# Patient Record
Sex: FEMALE | Race: WHITE | Employment: OTHER | URBAN - METROPOLITAN AREA
[De-identification: names, ages, dates, MRNs, and addresses within clinical notes are randomized per-mention and may not be internally consistent; named-entity substitution may affect disease eponyms.]

---

## 2017-01-05 ENCOUNTER — HOSPITAL ENCOUNTER (OUTPATIENT)
Dept: MAMMOGRAPHY | Age: 57
Discharge: HOME | End: 2017-01-05
Attending: INTERNAL MEDICINE
Payer: MEDICARE

## 2017-01-05 DIAGNOSIS — Z12.31 VISIT FOR SCREENING MAMMOGRAM: ICD-10-CM

## 2017-01-05 PROCEDURE — 77067 SCR MAMMO BI INCL CAD: CPT

## 2017-12-21 ENCOUNTER — PATIENT OUTREACH (OUTPATIENT)
Dept: CASE MANAGEMENT | Age: 57
End: 2017-12-21

## 2018-04-18 ENCOUNTER — HOSPITAL ENCOUNTER (OUTPATIENT)
Dept: LAB | Age: 58
Discharge: HOME | End: 2018-04-18

## 2018-04-18 PROCEDURE — 88305 TISSUE EXAM BY PATHOLOGIST: CPT | Performed by: INTERNAL MEDICINE

## 2018-05-10 ENCOUNTER — HOSPITAL ENCOUNTER (OUTPATIENT)
Dept: MAMMOGRAPHY | Age: 58
Discharge: HOME | End: 2018-05-10
Attending: INTERNAL MEDICINE
Payer: MEDICARE

## 2018-05-10 DIAGNOSIS — Z12.39 BREAST CANCER SCREENING, HIGH RISK PATIENT: ICD-10-CM

## 2018-05-10 PROCEDURE — 77063 BREAST TOMOSYNTHESIS BI: CPT

## 2019-05-28 ENCOUNTER — HOSPITAL ENCOUNTER (OUTPATIENT)
Dept: MAMMOGRAPHY | Age: 59
Discharge: HOME | End: 2019-05-28
Attending: INTERNAL MEDICINE
Payer: MEDICARE

## 2019-05-28 DIAGNOSIS — Z12.39 BREAST CANCER SCREENING, HIGH RISK PATIENT: ICD-10-CM

## 2019-05-28 PROCEDURE — 77063 BREAST TOMOSYNTHESIS BI: CPT

## 2020-03-02 ENCOUNTER — HOSPITAL ENCOUNTER (EMERGENCY)
Age: 60
Discharge: HOME | End: 2020-03-02
Attending: EMERGENCY MEDICINE
Payer: MEDICARE

## 2020-03-02 VITALS
BODY MASS INDEX: 19.55 KG/M2 | HEART RATE: 90 BPM | WEIGHT: 132 LBS | RESPIRATION RATE: 18 BRPM | SYSTOLIC BLOOD PRESSURE: 137 MMHG | HEIGHT: 69 IN | TEMPERATURE: 98.3 F | DIASTOLIC BLOOD PRESSURE: 90 MMHG | OXYGEN SATURATION: 100 %

## 2020-03-02 DIAGNOSIS — S61.211A LACERATION OF LEFT INDEX FINGER WITHOUT FOREIGN BODY WITHOUT DAMAGE TO NAIL, INITIAL ENCOUNTER: Primary | ICD-10-CM

## 2020-03-02 PROCEDURE — 99282 EMERGENCY DEPT VISIT SF MDM: CPT

## 2020-03-02 PROCEDURE — 90715 TDAP VACCINE 7 YRS/> IM: CPT | Performed by: EMERGENCY MEDICINE

## 2020-03-02 PROCEDURE — 74011250636 HC RX REV CODE- 250/636: Performed by: EMERGENCY MEDICINE

## 2020-03-02 PROCEDURE — 75810000293 HC SIMP/SUPERF WND  RPR

## 2020-03-02 PROCEDURE — 90471 IMMUNIZATION ADMIN: CPT

## 2020-03-02 RX ADMIN — TETANUS TOXOID, REDUCED DIPHTHERIA TOXOID AND ACELLULAR PERTUSSIS VACCINE, ADSORBED 0.5 ML: 5; 2.5; 8; 8; 2.5 SUSPENSION INTRAMUSCULAR at 20:07

## 2020-03-02 NOTE — ED TRIAGE NOTES
Patient reports left middle finger cut on glass. Wrapped and bleeding controlled in triage. Last tetanus shot > 10 years ago.

## 2020-03-03 NOTE — DISCHARGE INSTRUCTIONS
Patient Education        Cuts Closed With Adhesives: Care Instructions  Your Care Instructions  A cut can happen anywhere on your body. The doctor used an adhesive to close the cut. When the adhesive dries, it forms a film that holds the edges of the cut together. Skin adhesives are sometimes called liquid stitches. If the cut went deep and through the skin, the doctor may have put in a layer of stitches below the adhesive. The deeper layer of stitches brings the deep part of the cut together. These stitches will dissolve and don't need to be removed. You don't see the stitches, only the adhesive. You may have a bandage. The doctor has checked you carefully, but problems can develop later. If you notice any problems or new symptoms, get medical treatment right away. Follow-up care is a key part of your treatment and safety. Be sure to make and go to all appointments, and call your doctor if you are having problems. It's also a good idea to know your test results and keep a list of the medicines you take. How can you care for yourself at home? · Keep the cut dry for the first 24 to 48 hours. After this, you can shower if your doctor okays it. Pat the cut dry. · Don't soak the cut, such as in a bathtub. Your doctor will tell you when it's safe to get the cut wet. · If your doctor told you how to care for your cut, follow your doctor's instructions. If you did not get instructions, follow this general advice:  ? Do not put any kind of ointment, cream, or lotion over the area. This can make the adhesive fall off too soon. ? After the first 24 to 48 hours, wash around the cut with clean water 2 times a day. Do not use hydrogen peroxide or alcohol, which can slow healing. ? If the doctor told you to use a bandage, put on a new bandage after cleaning the cut or if the bandage gets wet or dirty. · Prop up the sore area on a pillow anytime you sit or lie down during the next 3 days.  Try to keep it above the level of your heart. This will help reduce swelling. · Leave the skin adhesive on your skin until it falls off on its own. This may take 5 to 10 days. · Do not scratch, rub, or pick at the adhesive. · Do not put the sticky part of a bandage directly on the adhesive. · Avoid any activity that could cause your cut to reopen. · Be safe with medicines. Read and follow all instructions on the label. ? If the doctor gave you a prescription medicine for pain, take it as prescribed. ? If you are not taking a prescription pain medicine, ask your doctor if you can take an over-the-counter medicine. When should you call for help? Call your doctor now or seek immediate medical care if:    · You have new pain, or your pain gets worse.     · The skin near the cut is cold or pale or changes color.     · You have tingling, weakness, or numbness near the cut.     · The cut starts to bleed.     · You have trouble moving the area near the cut.     · You have symptoms of infection, such as:  ? Increased pain, swelling, warmth, or redness around the cut.  ? Red streaks leading from the cut.  ? Pus draining from the cut.  ? A fever.    Watch closely for changes in your health, and be sure to contact your doctor if:    · The cut reopens.     · You do not get better as expected. Where can you learn more? Go to http://wilmer-jewels.info/. Enter P174 in the search box to learn more about \"Cuts Closed With Adhesives: Care Instructions. \"  Current as of: June 26, 2019  Content Version: 12.2  © 9664-0790 [employer]. Care instructions adapted under license by [employer] (which disclaims liability or warranty for this information). If you have questions about a medical condition or this instruction, always ask your healthcare professional. [address] any warranty or liability for your use of this information.
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
## 2020-06-29 PROBLEM — G24.9 DYSTONIA: Status: ACTIVE | Noted: 2020-02-01

## 2020-08-08 ENCOUNTER — HOSPITAL ENCOUNTER (OUTPATIENT)
Dept: MAMMOGRAPHY | Age: 60
Discharge: HOME | End: 2020-08-08
Attending: INTERNAL MEDICINE
Payer: MEDICARE

## 2020-08-08 DIAGNOSIS — Z12.31 VISIT FOR SCREENING MAMMOGRAM: ICD-10-CM

## 2020-08-08 PROCEDURE — 77063 BREAST TOMOSYNTHESIS BI: CPT

## 2020-08-11 NOTE — PROGRESS NOTES
I see that your mammogram showed a mild asymmetry and the radiologist recommended additional mammogram pictures and possibly an ultrasound. This is standard  protocol with any subtle differences they detect. They will contact you to schedule this. Most of us worry a bit when we get this call, but we usually have good news that all is well, so it's truly NOT time to panic.

## 2020-08-12 ENCOUNTER — HOSPITAL ENCOUNTER (OUTPATIENT)
Dept: MAMMOGRAPHY | Age: 60
Discharge: HOME | End: 2020-08-12
Attending: INTERNAL MEDICINE
Payer: MEDICARE

## 2020-08-12 DIAGNOSIS — R92.8 ABNORMAL SCREENING MAMMOGRAM: ICD-10-CM

## 2020-08-12 PROCEDURE — 76642 ULTRASOUND BREAST LIMITED: CPT

## 2020-08-12 PROCEDURE — 77065 DX MAMMO INCL CAD UNI: CPT

## 2020-08-13 NOTE — PROGRESS NOTES
Good news! Let's be sure to repeat the left mammogram in FEb 2020. Norma Longo that on your calendar!

## 2020-08-13 NOTE — PROGRESS NOTES
AND I went ahead and put in the order NOW, so that the radiology schedulers will call you. Go ahead and pick out an appt in Feb so you won't forget.

## 2021-02-04 ENCOUNTER — HOSPITAL ENCOUNTER (OUTPATIENT)
Dept: MAMMOGRAPHY | Age: 61
Discharge: HOME | End: 2021-02-04
Attending: INTERNAL MEDICINE
Payer: MEDICARE

## 2021-02-04 DIAGNOSIS — R92.8 ABNORMAL MAMMOGRAM: ICD-10-CM

## 2021-02-04 PROCEDURE — 77065 DX MAMMO INCL CAD UNI: CPT

## 2021-05-14 ENCOUNTER — HOSPITAL ENCOUNTER (OUTPATIENT)
Dept: PHYSICAL THERAPY | Age: 61
Discharge: HOME | End: 2021-05-14
Attending: PSYCHIATRY & NEUROLOGY
Payer: MEDICARE

## 2021-05-14 DIAGNOSIS — F80.81 STAMMERING AND STUTTERING: ICD-10-CM

## 2021-05-14 PROCEDURE — 92521 EVALUATION OF SPEECH FLUENCY: CPT | Performed by: SPEECH-LANGUAGE PATHOLOGIST

## 2021-05-14 NOTE — THERAPY EVALUATION
Alva Arango : 1960 [place] Secondary:  [facility] Center at [address] [place] [address], [address], [address] [place] [address] [phone]   [phone] OUTPATIENT SPEECH LANGUAGE PATHOLOGY: Initial Assessment ICD-10: Treatment Diagnosis: R47.82 Fluency disorder REFERRING PHYSICIAN: Jigna Campoverde MD MD Orders: evaluate and treat Return Physician Appointment: Unknown PAST MEDICAL HISTORY:  
Ms. Nelle Cabot is a 64 y.o. female who  has a past medical history of Environmental allergies (10/10/2016), Hip strain (), Moderate episode of recurrent major depressive disorder ([facility] [address] (10/10/2016), Pure hypercholesterolemia (10/10/2016), and Sensory neuropathy, hereditary (10/10/2016). She also  has a past surgical history that includes hx other surgical. 
MEDICAL/REFERRING DIAGNOSIS: Stammering and stuttering [F80.81] DATE OF ONSET: 20 months ago PRIOR LEVEL OF FUNCTION: Independent with ADL's PRECAUTIONS/ALLERGIES: NKDA ASSESSMENT: 
Patient is a 64year old female who was referred for speech evaluation due to stammering and stuttering. She reports that her issues with her speech started about 20 months ago. Patient reports that she was living with her sister but it wasn't a pleasant experience. She went to visit her parent's to let them know that she could no longer live with her sister and her parent's were not happy with that situation. About a week later, she began stuttering. She has now since moved out on her own but her stuttering comes and goes and depends on what she's doing and who's she's talking to she will stutter. She endorses that her speech is worse when she's anxious. Patient had shock therapy about 10 years ago. She had 11 shock therapies. She said she ultimately stopped because they were not working.   
 
 
Based on the objective data described below, the patient presents with clinical  s/sx of prolongations and repetitions of sounds related to environmental stressors, significantly impacted by stress/anxiety. Oral motor exam was WNL's. She was given words to read to see if there was a pattern of specific sounds that she would stutter on in addition to figuring out if the stutter was on the initial, medial or final position of words. . She read a total of 50 words. She correctly read 30/50 words without stuttering like characteristics. At the sentence level, she read 10 sentences and had prolongations/repetitions of initial sound on 9/10 sentences. It appears that she has the most difficulty with /s/ and /g/ at the initial position. On occasion, she would prolng on /d/ and /l/ at the initial position. ST recommends treatment at 1x a week to learn breathing/relaxation and easy onset speech strategies to help decrease the amount of prolongations and repetitions so she's able to communicate effectively. She is in agreement with this plan. We also discussed the importance of minimizing/decreasing environmental triggers as well. Patient will benefit from skilled intervention to address the above impairments. ?????? ? ? This section established at most recent assessment?????????? 
PROBLEM LIST (Impairments causing functional limitations): 1. Fluency GOALS: (Goals have been discussed and agreed upon with patient.) SHORT-TERM FUNCTIONAL GOALS: Time Frame: 60 days 1. Patient will utilize breathing/relaxation techniques to help decrease the amount of prolongations at the word, phrase, sentence and conversational level at 80% accuracy. 2. Patient will utilize easy onset strategies at the words, phrase, sentence and conversational level at 80% accuracy. DISCHARGE GOALS: Time Frame: 2 months 1. Patientt will develop functional and intelligible speech and utilize compensatory strategies 
through the use of adequate labial and lingual function, increased articulatory precision and 
speech prosody REHABILITATION POTENTIAL FOR STATED GOALS: GoodPLAN OF CARE: 
INTERVENTIONS PLANNED: (Benefits and precautions of therapy have been discussed with the patient.) 1. Speech therapy TREATMENT PLAN EFFECTIVE DATES: 5/14/2021 TO 7/13/2021 (60 days). FREQUENCY/DURATION: Continue to follow patient 1 time a week for 60 days to address above goals. Regarding Alva Arango's therapy, I certify that the treatment plan above will be carried out by a therapist or under their direction. Thank you for this referral, 
[facility] Referring Physician Signature: Edwin Roberts MD     Date SUBJECTIVE: 
Alert and pleasant Present Symptoms: stuttering Current Medications:  
Current Outpatient Medications on File Prior to Encounter Medication Sig Dispense Refill  ergocalciferol (CALCIFEROL) 200 mcg/mL (8,000 unit/mL) drops Take 2,000 mg by mouth.  naproxen (NAPROSYN) 500 mg tablet Take 440 mg by mouth.  nicotine (NICORETTE) 2 mg gum 2 mg by Buccal route.  buPROPion XL (WELLBUTRIN XL) 300 mg XL tablet Take 1 Tab by mouth every morning. 90 Tab 1  
 FLUoxetine (PROzac) 20 mg capsule Take 2 Caps by mouth daily. 180 Cap 4  
 lamoTRIgine (LaMICtaL) 100 mg tablet Take 1 Tab by mouth two (2) times a day. 180 Tab 1  
 traZODone (DESYREL) 100 mg tablet Take 1-2 Tabs by mouth nightly. 60 Tab 3  clonazePAM (KlonoPIN) 0.5 mg tablet Take 1 Tab by mouth daily as needed for Anxiety. 30 Tab 1  clonazePAM (KlonoPIN) 0.5 mg tablet Take 1 Tab by mouth two (2) times daily as needed (tremor). Max Daily Amount: 1 mg. 60 Tab 2  
 azelastine (ASTELIN) 137 mcg (0.1 %) nasal spray 1 [place] by Both Nostrils route two (2) times a day. 1 Bottle 5  
 levocetirizine (XYZAL) 5 mg tablet Take 1 Tab by mouth daily. 90 Tab 3  cholecalciferol (VITAMIN D3) 1,000 unit cap Take 1 Cap by mouth daily.  multivitamin (ONE A DAY) tablet Take 1 Tab by mouth daily.     
 
No current facility-administered medications on file prior to encounter. Date Last Reviewed: 5/14/21 Social History/Home Situation:  Work/Activity History: Does not work/ Disability OBJECTIVE: 
Objective Measure: Tool Used: Fluency: 
Score:  Initial: 4 Most Recent: X (Date: -- ) Interpretation of Score: This measure should not be used for individuals who exhibit difficulty with rate and prosody as a result of a neurological impairment, cluttering, foreign dialect, or developmental disability. o Level 1:  Fluency is so disrupted that speech is often not functional for communication. Attempts at speech communication are extremely labored in all situations, which render the speaker virtually unintelligible. Alternative means of speaking are used most of the time. Listeners avoid spoken interaction with the individual. 
o Level 2: Speech is functional most of the time, but labored in many day-to-day situations due to extended disruptions of speech flow which sometimes render the individual difficult to understand. Participation in vocational, avocational, and social activities requiring speech is reduced overall. Listener discomfort is evident throughout conversational interactions. o Level 3: Speech is functional. Dysfluencies are evident in all situations, but are particularly frequent in problem situations. Vocational, avocational, and social participation requiring speech is occasionally reduced overall, and significantly reduced within what the individual perceives as problem situations. Some listener discomfort is evident throughout interactions. o Level 4: Speech is functional for communication, but there is extreme situational variation. The frequency and severity of disruptions of speech flow within problem situations is distracting most but not all of the time. Vocational, avocational, and social participation requiring speech is limited most of the time in problem situations. Listeners are often aware of fluency difficulty. o Level 5: Speech is functional for communication, and fluency can be maintained in some situations. Self-monitoring is inconsistent. The frequency and severity of disruptions of speech flow within problem situations is distracting some of the time. Speech difficulties are noticeable when they occur, and sometimes limit vocational, avocational, and social activities requiring speech in problem situations. Listeners are occasionally aware of fluency difficulties relative to particular situations. o Level 6: Speech is functional for communication, and fluency can be maintained most of the time. Self-monitoring is consistent. Vocational, avocational, and social activities requiring speech is not restricted most of the time. Listeners are infrequently aware of fluency difficulties even in problem situations. o Level 7: Disruptions in speech flow do not call attention to the speaker, and participation in activities requiring speech is not limited. May include self-monitoring as needed. Oral Motor Structure/Speech: SPEECH-LANGUAGE COGNITIVE EVALUATION Tests Given: Informal Assessment Mental Status: Alert and pleasant Verbal Expression: 
 
Pragmatics: Emotional  
 
Assessment only; No treatment(s) provided today 
__________________________________________________________________________________________________ History of Present Injury/Illness (Reason for Referral): Fluency Treatment Assessment:  Evaluation completed. Progression/Medical Necessity:  
· Patient is expected to demonstrate progress in expressive communication to increase independence with activities of daily living. Compliance with Program/Exercises: Will assess as treatment progresses}. Reason for Continuation of Services/Other Comments: 
· Patient continues to require skilled intervention due to medical complications. Recommendations/Intent for next treatment session: \"Treatment next visit will focus on goals\".    
Total Treatment Duration: 
Time In: 9012 Time Out: 0930 [facility]

## 2021-05-17 ENCOUNTER — HOSPITAL ENCOUNTER (OUTPATIENT)
Dept: PHYSICAL THERAPY | Age: 61
Discharge: HOME | End: 2021-05-17
Attending: PSYCHIATRY & NEUROLOGY
Payer: MEDICARE

## 2021-05-17 PROCEDURE — 92526 ORAL FUNCTION THERAPY: CPT | Performed by: SPEECH-LANGUAGE PATHOLOGIST

## 2021-05-17 NOTE — THERAPY EVALUATION
Lizz Mehta  : 1960  [place]  Secondary:  [facility] [place] [id number], [address] [place] [address],[address], [place] [address].  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 Marilu Desir is a 64 y.o. female who  has a past medical history of Environmental allergies (10/10/2016), Hip strain (), Moderate episode of recurrent major depressive disorder ([facility] [address] (10/10/2016), Pure hypercholesterolemia (10/10/2016), and Sensory neuropathy, hereditary (10/10/2016). She also  has a past surgical history that includes hx other surgical.  MEDICAL/REFERRING DIAGNOSIS: speech  DATE OF ONSET: 20 months ago  PRIOR LEVEL OF FUNCTION: Independent with ADL's  PRECAUTIONS/ALLERGIES: NKDA  ASSESSMENT:  Patient is a 64year old female who was referred for speech evaluation due to stammering and stuttering. She reports that her issues with her speech started about 20 months ago. Patient reports that she was living with her sister but it wasn't a pleasant experience. She went to visit her parent's to let them know that she could no longer live with her sister and her parent's were not happy with that situation. About a week later, she began stuttering. She has now since moved out on her own but her stuttering comes and goes and depends on what she's doing and who's she's talking to she will stutter. She endorses that her speech is worse when she's anxious. Patient had shock therapy about 10 years ago. She had 11 shock therapies. She said she ultimately stopped because they were not working.        Based on the objective data described below, the patient presents with clinical  s/sx of prolongations and repetitions of sounds related to environmental stressors, significantly impacted by stress/anxiety. Oral motor exam was WNL's. She was given words to read to see if there was a pattern of specific sounds that she would stutter on in addition to figuring out if the stutter was on the initial, medial or final position of words. . She read a total of 50 words. She correctly read 30/50 words without stuttering like characteristics. At the sentence level, she read 10 sentences and had prolongations/repetitions of initial sound on 9/10 sentences. It appears that she has the most difficulty with /s/ and /g/ at the initial position. On occasion, she would prolng on /d/ and /l/ at the initial position. Patient present for therapy this date. No complaints. Read /s/ words using easy onset strategy for breathing and relaxation and prolonged the initial sound with min to mod cues. See below specific tasks. Patient will benefit from skilled intervention to address the above impairments. ?????? ? ? This section established at most recent assessment??????????  PROBLEM LIST (Impairments causing functional limitations):  1. Fluency   GOALS: (Goals have been discussed and agreed upon with patient.)  SHORT-TERM FUNCTIONAL GOALS: Time Frame: 60 days  1. Patient will utilize breathing/relaxation techniques to help decrease the amount of prolongations at the word, phrase, sentence and conversational level at 80% accuracy. 2. Patient will utilize easy onset strategies at the words, phrase, sentence and conversational level at 80% accuracy. DISCHARGE GOALS: Time Frame: 2 months  1. Patientt will develop functional and intelligible speech and utilize compensatory strategies  through the use of adequate labial and lingual function, increased articulatory precision and  speech prosody  REHABILITATION POTENTIAL FOR STATED GOALS: GoodPLAN OF CARE:  INTERVENTIONS PLANNED: (Benefits and precautions of therapy have been discussed with the patient.)  1.  Speech therapy  TREATMENT PLAN EFFECTIVE DATES: 5/14/2021 TO 7/13/2021 (60 days). FREQUENCY/DURATION: Continue to follow patient 1 time a week for 60 days to address above goals. Regarding Alva Arango's therapy, I certify that the treatment plan above will be carried out by a therapist or under their direction. Thank you for this referral,  Ressie Bosworth, M. Ed CCC-SLP                   Referring Physician Signature: Yasmani Nick MD     Date      SUBJECTIVE:  Alert and pleasant   Present Symptoms: stuttering       Current Medications:   Current Outpatient Medications on File Prior to Encounter   Medication Sig Dispense Refill    ergocalciferol (CALCIFEROL) 200 mcg/mL (8,000 unit/mL) drops Take 2,000 mg by mouth.  naproxen (NAPROSYN) 500 mg tablet Take 440 mg by mouth.  nicotine (NICORETTE) 2 mg gum 2 mg by Buccal route.  buPROPion XL (WELLBUTRIN XL) 300 mg XL tablet Take 1 Tab by mouth every morning. 90 Tab 1    FLUoxetine (PROzac) 20 mg capsule Take 2 Caps by mouth daily. 180 Cap 4    lamoTRIgine (LaMICtaL) 100 mg tablet Take 1 Tab by mouth two (2) times a day. 180 Tab 1    traZODone (DESYREL) 100 mg tablet Take 1-2 Tabs by mouth nightly. 60 Tab 3    clonazePAM (KlonoPIN) 0.5 mg tablet Take 1 Tab by mouth daily as needed for Anxiety. 30 Tab 1    clonazePAM (KlonoPIN) 0.5 mg tablet Take 1 Tab by mouth two (2) times daily as needed (tremor). Max Daily Amount: 1 mg. 60 Tab 2    azelastine (ASTELIN) 137 mcg (0.1 %) nasal spray 1 [place] by Both Nostrils route two (2) times a day. 1 Bottle 5    levocetirizine (XYZAL) 5 mg tablet Take 1 Tab by mouth daily. 90 Tab 3    cholecalciferol (VITAMIN D3) 1,000 unit cap Take 1 Cap by mouth daily.  multivitamin (ONE A DAY) tablet Take 1 Tab by mouth daily. No current facility-administered medications on file prior to encounter.          Date Last Reviewed: 5/17/21      Social History/Home Situation:        Work/Activity History: Does not work/ Disability OBJECTIVE:  Objective Measure: Tool Used: Fluency:  Score:  Initial: 4 Most Recent: X (Date: -- )   Interpretation of Score: This measure should not be used for individuals who exhibit difficulty with rate and prosody as a result of a neurological impairment, cluttering, foreign dialect, or developmental disability. o Level 1:  Fluency is so disrupted that speech is often not functional for communication. Attempts at speech communication are extremely labored in all situations, which render the speaker virtually unintelligible. Alternative means of speaking are used most of the time. Listeners avoid spoken interaction with the individual.  o Level 2: Speech is functional most of the time, but labored in many day-to-day situations due to extended disruptions of speech flow which sometimes render the individual difficult to understand. Participation in vocational, avocational, and social activities requiring speech is reduced overall. Listener discomfort is evident throughout conversational interactions. o Level 3: Speech is functional. Dysfluencies are evident in all situations, but are particularly frequent in problem situations. Vocational, avocational, and social participation requiring speech is occasionally reduced overall, and significantly reduced within what the individual perceives as problem situations. Some listener discomfort is evident throughout interactions. o Level 4: Speech is functional for communication, but there is extreme situational variation. The frequency and severity of disruptions of speech flow within problem situations is distracting most but not all of the time. Vocational, avocational, and social participation requiring speech is limited most of the time in problem situations. Listeners are often aware of fluency difficulty. o Level 5: Speech is functional for communication, and fluency can be maintained in some situations. Self-monitoring is inconsistent.  The frequency and severity of disruptions of speech flow within problem situations is distracting some of the time. Speech difficulties are noticeable when they occur, and sometimes limit vocational, avocational, and social activities requiring speech in problem situations. Listeners are occasionally aware of fluency difficulties relative to particular situations. o Level 6: Speech is functional for communication, and fluency can be maintained most of the time. Self-monitoring is consistent. Vocational, avocational, and social activities requiring speech is not restricted most of the time. Listeners are infrequently aware of fluency difficulties even in problem situations. o Level 7: Disruptions in speech flow do not call attention to the speaker, and participation in activities requiring speech is not limited. May include self-monitoring as needed. Oral Motor Structure/Speech:     SPEECH-LANGUAGE COGNITIVE EVALUATION    Tests Given: Informal Assessment    Mental Status: Alert and pleasant     Verbal Expression:    Pragmatics: Emotional     Speech/Language Activities: Activities/Procedures listed utilized to improve expressive communication. Required moderate cueing to increase communication with family/caregivers. 1. Read /s/ words:   CV: 4/5 at [address] [place] [address] with 80% accuracy     V-C: Independently at 100% accuracy     CVC:   1st trial: 10/12: Min A with 83% accuracy     2nd trial: 11/12: Mod I 90% accuracy       __________________________________________________________________________________________________  History of Present Injury/Illness (Reason for Referral): Fluency  Treatment Assessment:  Evaluation completed. Progression/Medical Necessity:   · Patient is expected to demonstrate progress in expressive communication to increase independence with activities of daily living. Compliance with Program/Exercises: Will assess as treatment progresses}.    Reason for Continuation of Services/Other Comments:  · Patient continues to require skilled intervention due to medical complications. Recommendations/Intent for next treatment session: \"Treatment next visit will focus on goals\". Total Treatment Duration:  Time In: 1105  Time Out: 4489 [facility], [place]

## 2021-06-03 ENCOUNTER — HOSPITAL ENCOUNTER (OUTPATIENT)
Dept: PHYSICAL THERAPY | Age: 61
Discharge: HOME | End: 2021-06-03
Attending: PSYCHIATRY & NEUROLOGY
Payer: MEDICARE

## 2021-06-03 PROCEDURE — 92507 TX SP LANG VOICE COMM INDIV: CPT | Performed by: SPEECH-LANGUAGE PATHOLOGIST

## 2021-06-03 NOTE — PROGRESS NOTES
Valencia Rodriguez  : 1960  [place]  Secondary:  [facility] [place] [id number] [facility], [address] [place] [address],[address], [place] [address].  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 Michael Schwartz is a 64 y.o. female who  has a past medical history of Environmental allergies (10/10/2016), Hip strain (), Moderate episode of recurrent major depressive disorder ([facility] [address] (10/10/2016), Pure hypercholesterolemia (10/10/2016), and Sensory neuropathy, hereditary (10/10/2016). She also  has a past surgical history that includes hx other surgical.  MEDICAL/REFERRING DIAGNOSIS: speech  DATE OF ONSET: 20 months ago  PRIOR LEVEL OF FUNCTION: Independent with ADL's  PRECAUTIONS/ALLERGIES: NKDA  ASSESSMENT:  Patient is a 64year old female who was referred for speech evaluation due to stammering and stuttering. She reports that her issues with her speech started about 20 months ago. Patient reports that she was living with her sister but it wasn't a pleasant experience. She went to visit her parent's to let them know that she could no longer live with her sister and her parent's were not happy with that situation. About a week later, she began stuttering. She has now since moved out on her own but her stuttering comes and goes and depends on what she's doing and who's she's talking to she will stutter. She endorses that her speech is worse when she's anxious. Patient had shock therapy about 10 years ago. She had 11 shock therapies. She said she ultimately stopped because they were not working.        Based on the objective data described below, the patient presents with clinical  s/sx of prolongations and repetitions of sounds related to environmental stressors, significantly impacted by stress/anxiety. Oral motor exam was WNL's. She was given words to read to see if there was a pattern of specific sounds that she would stutter on in addition to figuring out if the stutter was on the initial, medial or final position of words. . She read a total of 50 words. She correctly read 30/50 words without stuttering like characteristics. At the sentence level, she read 10 sentences and had prolongations/repetitions of initial sound on 9/10 sentences. It appears that she has the most difficulty with /s/ and /g/ at the initial position. On occasion, she would prolng on /d/ and /l/ at the initial position. Patient present for therapy this date. No complaints. Read /s/ words using easy onset strategy for breathing and relaxation and prolonged the initial sound with min to mod cues. See below specific tasks. Patient will benefit from skilled intervention to address the above impairments. ?????? ? ? This section established at most recent assessment??????????  PROBLEM LIST (Impairments causing functional limitations):  1. Fluency   GOALS: (Goals have been discussed and agreed upon with patient.)  SHORT-TERM FUNCTIONAL GOALS: Time Frame: 60 days  1. Patient will utilize breathing/relaxation techniques to help decrease the amount of prolongations at the word, phrase, sentence and conversational level at 80% accuracy. 2. Patient will utilize easy onset strategies at the words, phrase, sentence and conversational level at 80% accuracy. DISCHARGE GOALS: Time Frame: 2 months  1. Patientt will develop functional and intelligible speech and utilize compensatory strategies  through the use of adequate labial and lingual function, increased articulatory precision and  speech prosody  REHABILITATION POTENTIAL FOR STATED GOALS: GoodPLAN OF CARE:  INTERVENTIONS PLANNED: (Benefits and precautions of therapy have been discussed with the patient.)  1.  Speech therapy  TREATMENT PLAN EFFECTIVE DATES: 5/14/2021 TO 7/13/2021 (60 days). FREQUENCY/DURATION: Continue to follow patient 1 time a week for 60 days to address above goals. Regarding Alva Arango's therapy, I certify that the treatment plan above will be carried out by a therapist or under their direction. Thank you for this referral,  CEM Milligan Ed CCC-SLP                   Referring Physician Signature: Edwin Roberts MD     Date      SUBJECTIVE:  Alert and pleasant   Present Symptoms: stuttering       Current Medications:   Current Outpatient Medications on File Prior to Encounter   Medication Sig Dispense Refill    ergocalciferol (CALCIFEROL) 200 mcg/mL (8,000 unit/mL) drops Take 2,000 mg by mouth.  naproxen (NAPROSYN) 500 mg tablet Take 440 mg by mouth.  nicotine (NICORETTE) 2 mg gum 2 mg by Buccal route.  buPROPion XL (WELLBUTRIN XL) 300 mg XL tablet Take 1 Tab by mouth every morning. 90 Tab 1    FLUoxetine (PROzac) 20 mg capsule Take 2 Caps by mouth daily. 180 Cap 4    lamoTRIgine (LaMICtaL) 100 mg tablet Take 1 Tab by mouth two (2) times a day. 180 Tab 1    traZODone (DESYREL) 100 mg tablet Take 1-2 Tabs by mouth nightly. 60 Tab 3    clonazePAM (KlonoPIN) 0.5 mg tablet Take 1 Tab by mouth daily as needed for Anxiety. 30 Tab 1    clonazePAM (KlonoPIN) 0.5 mg tablet Take 1 Tab by mouth two (2) times daily as needed (tremor). Max Daily Amount: 1 mg. 60 Tab 2    azelastine (ASTELIN) 137 mcg (0.1 %) nasal spray 1 [place] by Both Nostrils route two (2) times a day. 1 Bottle 5    levocetirizine (XYZAL) 5 mg tablet Take 1 Tab by mouth daily. 90 Tab 3    cholecalciferol (VITAMIN D3) 1,000 unit cap Take 1 Cap by mouth daily.  multivitamin (ONE A DAY) tablet Take 1 Tab by mouth daily. No current facility-administered medications on file prior to encounter.         Date Last Reviewed: 6/3/2021      Social History/Home Situation:        Work/Activity History: Does not work/ Disability OBJECTIVE:  Objective Measure: Tool Used: Fluency:  Score:  Initial: 4 Most Recent: X (Date: -- )   Interpretation of Score: This measure should not be used for individuals who exhibit difficulty with rate and prosody as a result of a neurological impairment, cluttering, foreign dialect, or developmental disability. o Level 1:  Fluency is so disrupted that speech is often not functional for communication. Attempts at speech communication are extremely labored in all situations, which render the speaker virtually unintelligible. Alternative means of speaking are used most of the time. Listeners avoid spoken interaction with the individual.  o Level 2: Speech is functional most of the time, but labored in many day-to-day situations due to extended disruptions of speech flow which sometimes render the individual difficult to understand. Participation in vocational, avocational, and social activities requiring speech is reduced overall. Listener discomfort is evident throughout conversational interactions. o Level 3: Speech is functional. Dysfluencies are evident in all situations, but are particularly frequent in problem situations. Vocational, avocational, and social participation requiring speech is occasionally reduced overall, and significantly reduced within what the individual perceives as problem situations. Some listener discomfort is evident throughout interactions. o Level 4: Speech is functional for communication, but there is extreme situational variation. The frequency and severity of disruptions of speech flow within problem situations is distracting most but not all of the time. Vocational, avocational, and social participation requiring speech is limited most of the time in problem situations. Listeners are often aware of fluency difficulty. o Level 5: Speech is functional for communication, and fluency can be maintained in some situations. Self-monitoring is inconsistent.  The frequency and severity of disruptions of speech flow within problem situations is distracting some of the time. Speech difficulties are noticeable when they occur, and sometimes limit vocational, avocational, and social activities requiring speech in problem situations. Listeners are occasionally aware of fluency difficulties relative to particular situations. o Level 6: Speech is functional for communication, and fluency can be maintained most of the time. Self-monitoring is consistent. Vocational, avocational, and social activities requiring speech is not restricted most of the time. Listeners are infrequently aware of fluency difficulties even in problem situations. o Level 7: Disruptions in speech flow do not call attention to the speaker, and participation in activities requiring speech is not limited. May include self-monitoring as needed. Oral Motor Structure/Speech:     SPEECH-LANGUAGE COGNITIVE EVALUATION    Tests Given: Informal Assessment    Mental Status: Alert and pleasant     Verbal Expression:    Pragmatics: Emotional     Speech/Language Activities: Activities/Procedures listed utilized to improve expressive communication. Required moderate cueing to increase communication with family/caregivers. 1. Read /s/ words:   CV: 4/5 at [address] [place] [address] with 80% accuracy     V-C: Independently at 100% accuracy     CVC:   1st trial: 10/12: Min A with 83% accuracy     2nd trial: 11/12: Mod I 90% accuracy         __________________________________________________________________________________________________  History of Present Injury/Illness (Reason for Referral): Fluency  Treatment Assessment:  Evaluation completed. Progression/Medical Necessity:   · Patient is expected to demonstrate progress in expressive communication to increase independence with activities of daily living. Compliance with Program/Exercises: Will assess as treatment progresses}.    Reason for Continuation of Services/Other Comments:  · Patient continues to require skilled intervention due to medical complications. Recommendations/Intent for next treatment session: \"Treatment next visit will focus on goals\". Total Treatment Duration:  Time In: 1300  Time Out: [address] [place], [place]. Nandini Monteiro

## 2021-06-10 ENCOUNTER — HOSPITAL ENCOUNTER (OUTPATIENT)
Dept: PHYSICAL THERAPY | Age: 61
Discharge: HOME | End: 2021-06-10
Attending: PSYCHIATRY & NEUROLOGY
Payer: MEDICARE

## 2021-06-10 PROCEDURE — 92507 TX SP LANG VOICE COMM INDIV: CPT | Performed by: SPEECH-LANGUAGE PATHOLOGIST

## 2021-06-10 NOTE — PROGRESS NOTES
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 Morris Steel is a 64 y.o. female who  has a past medical history of Environmental allergies (10/10/2016), Hip strain (), Moderate episode of recurrent major depressive disorder ([facility] [address] (10/10/2016), Pure hypercholesterolemia (10/10/2016), and Sensory neuropathy, hereditary (10/10/2016). She also  has a past surgical history that includes hx other surgical.  MEDICAL/REFERRING DIAGNOSIS: speech  DATE OF ONSET: 20 months ago  PRIOR LEVEL OF FUNCTION: Independent with ADL's  PRECAUTIONS/ALLERGIES: NKDA  ASSESSMENT:  Patient is a 64year old female who was referred for speech evaluation due to stammering and stuttering. She reports that her issues with her speech started about 20 months ago. Patient reports that she was living with her sister but it wasn't a pleasant experience. She went to visit her parent's to let them know that she could no longer live with her sister and her parent's were not happy with that situation. About a week later, she began stuttering. She has now since moved out on her own but her stuttering comes and goes and depends on what she's doing and who's she's talking to she will stutter. She endorses that her speech is worse when she's anxious. Patient had shock therapy about 10 years ago. She had 11 shock therapies. She said she ultimately stopped because they were not working.        Based on the objective data described below, the patient presents with clinical  s/sx of prolongations and repetitions of sounds related to environmental stressors, significantly impacted by stress/anxiety. Oral motor exam was WNL's. She was given words to read to see if there was a pattern of specific sounds that she would stutter on in addition to figuring out if the stutter was on the initial, medial or final position of words. . She read a total of 50 words. She correctly read 30/50 words without stuttering like characteristics. At the sentence level, she read 10 sentences and had prolongations/repetitions of initial sound on 9/10 sentences. It appears that she has the most difficulty with /s/ and /g/ at the initial position. On occasion, she would prolng on /d/ and /l/ at the initial position. Patient present for therapy this date. No complaints. Much improved speech this date with minimal hesitancy. She was able to use her slowed speech and increased breathing during conversation. Hesitancy observed x's 1. Patient will benefit from skilled intervention to address the above impairments. ?????? ? ? This section established at most recent assessment??????????  PROBLEM LIST (Impairments causing functional limitations):  1. Fluency   GOALS: (Goals have been discussed and agreed upon with patient.)  SHORT-TERM FUNCTIONAL GOALS: Time Frame: 60 days  1. Patient will utilize breathing/relaxation techniques to help decrease the amount of prolongations at the word, phrase, sentence and conversational level at 80% accuracy. 2. Patient will utilize easy onset strategies at the words, phrase, sentence and conversational level at 80% accuracy. DISCHARGE GOALS: Time Frame: 2 months  1. Patientt will develop functional and intelligible speech and utilize compensatory strategies  through the use of adequate labial and lingual function, increased articulatory precision and  speech prosody  REHABILITATION POTENTIAL FOR STATED GOALS: GoodPLAN OF CARE:  INTERVENTIONS PLANNED: (Benefits and precautions of therapy have been discussed with the patient.)  1.  Speech therapy  TREATMENT PLAN EFFECTIVE DATES: 5/14/2021 TO 7/13/2021 (60 days). FREQUENCY/DURATION: Continue to follow patient 1 time a week for 60 days to address above goals. Regarding Alva Arango's therapy, I certify that the treatment plan above will be carried out by a therapist or under their direction. Thank you for this referral,  [facility] CCC-SLP                   Referring Physician Signature: Raji Carr MD     Date      SUBJECTIVE:  Alert and pleasant   Present Symptoms: stuttering       Current Medications:   Current Outpatient Medications on File Prior to Encounter   Medication Sig Dispense Refill    ergocalciferol (CALCIFEROL) 200 mcg/mL (8,000 unit/mL) drops Take 2,000 mg by mouth.  naproxen (NAPROSYN) 500 mg tablet Take 440 mg by mouth.  nicotine (NICORETTE) 2 mg gum 2 mg by Buccal route.  buPROPion XL (WELLBUTRIN XL) 300 mg XL tablet Take 1 Tab by mouth every morning. 90 Tab 1    FLUoxetine (PROzac) 20 mg capsule Take 2 Caps by mouth daily. 180 Cap 4    lamoTRIgine (LaMICtaL) 100 mg tablet Take 1 Tab by mouth two (2) times a day. 180 Tab 1    traZODone (DESYREL) 100 mg tablet Take 1-2 Tabs by mouth nightly. 60 Tab 3    clonazePAM (KlonoPIN) 0.5 mg tablet Take 1 Tab by mouth daily as needed for Anxiety. 30 Tab 1    clonazePAM (KlonoPIN) 0.5 mg tablet Take 1 Tab by mouth two (2) times daily as needed (tremor). Max Daily Amount: 1 mg. 60 Tab 2    azelastine (ASTELIN) 137 mcg (0.1 %) nasal spray 1 [place] by Both Nostrils route two (2) times a day. 1 Bottle 5    levocetirizine (XYZAL) 5 mg tablet Take 1 Tab by mouth daily. 90 Tab 3    cholecalciferol (VITAMIN D3) 1,000 unit cap Take 1 Cap by mouth daily.  multivitamin (ONE A DAY) tablet Take 1 Tab by mouth daily. No current facility-administered medications on file prior to encounter.         Date Last Reviewed: 6/10/2021      Social History/Home Situation:        Work/Activity History: Does not work/ Disability     OBJECTIVE:  Objective Measure: Tool Used: Fluency:  Score:  Initial: 4 Most Recent: X (Date: -- )   Interpretation of Score: This measure should not be used for individuals who exhibit difficulty with rate and prosody as a result of a neurological impairment, cluttering, foreign dialect, or developmental disability. o Level 1:  Fluency is so disrupted that speech is often not functional for communication. Attempts at speech communication are extremely labored in all situations, which render the speaker virtually unintelligible. Alternative means of speaking are used most of the time. Listeners avoid spoken interaction with the individual.  o Level 2: Speech is functional most of the time, but labored in many day-to-day situations due to extended disruptions of speech flow which sometimes render the individual difficult to understand. Participation in vocational, avocational, and social activities requiring speech is reduced overall. Listener discomfort is evident throughout conversational interactions. o Level 3: Speech is functional. Dysfluencies are evident in all situations, but are particularly frequent in problem situations. Vocational, avocational, and social participation requiring speech is occasionally reduced overall, and significantly reduced within what the individual perceives as problem situations. Some listener discomfort is evident throughout interactions. o Level 4: Speech is functional for communication, but there is extreme situational variation. The frequency and severity of disruptions of speech flow within problem situations is distracting most but not all of the time. Vocational, avocational, and social participation requiring speech is limited most of the time in problem situations. Listeners are often aware of fluency difficulty. o Level 5: Speech is functional for communication, and fluency can be maintained in some situations. Self-monitoring is inconsistent.  The frequency and severity of disruptions of speech flow within problem situations is distracting some of the time. Speech difficulties are noticeable when they occur, and sometimes limit vocational, avocational, and social activities requiring speech in problem situations. Listeners are occasionally aware of fluency difficulties relative to particular situations. o Level 6: Speech is functional for communication, and fluency can be maintained most of the time. Self-monitoring is consistent. Vocational, avocational, and social activities requiring speech is not restricted most of the time. Listeners are infrequently aware of fluency difficulties even in problem situations. o Level 7: Disruptions in speech flow do not call attention to the speaker, and participation in activities requiring speech is not limited. May include self-monitoring as needed. Oral Motor Structure/Speech:     SPEECH-LANGUAGE COGNITIVE EVALUATION    Tests Given: Informal Assessment    Mental Status: Alert and pleasant     Verbal Expression:    Pragmatics: Emotional     Speech/Language Activities: Activities/Procedures listed utilized to improve expressive communication. Required moderate cueing to increase communication with family/caregivers. Participated in conversational starters using her speech strategies with hesitancy x's 1    __________________________________________________________________________________________________  History of Present Injury/Illness (Reason for Referral): Fluency  Treatment Assessment:  Evaluation completed. Progression/Medical Necessity:   · Patient is expected to demonstrate progress in expressive communication to increase independence with activities of daily living. Compliance with Program/Exercises: Will assess as treatment progresses}. Reason for Continuation of Services/Other Comments:  · Patient continues to require skilled intervention due to medical complications.   Recommendations/Intent for next treatment session: \"Treatment next visit will focus on goals\". Total Treatment Duration:  Time In: 1400  Time Out: 8[address] [place], [place] [address]

## 2021-06-17 ENCOUNTER — HOSPITAL ENCOUNTER (OUTPATIENT)
Dept: PHYSICAL THERAPY | Age: 61
Discharge: HOME | End: 2021-06-17
Attending: PSYCHIATRY & NEUROLOGY
Payer: MEDICARE

## 2021-06-17 PROCEDURE — 92507 TX SP LANG VOICE COMM INDIV: CPT | Performed by: SPEECH-LANGUAGE PATHOLOGIST

## 2021-06-17 NOTE — PROGRESS NOTES
Jeny Marshall  : 1960  [place]  Secondary:  Therapy Center at [address], [address] [place] [address],[address], [place] [address].  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 Nathalie Tenorio is a 64 y.o. female who  has a past medical history of Environmental allergies (10/10/2016), Hip strain (), Moderate episode of recurrent major depressive disorder ([facility] [address] (10/10/2016), Pure hypercholesterolemia (10/10/2016), and Sensory neuropathy, hereditary (10/10/2016). She also  has a past surgical history that includes hx other surgical.  MEDICAL/REFERRING DIAGNOSIS: speech  DATE OF ONSET: 20 months ago  PRIOR LEVEL OF FUNCTION: Independent with ADL's  PRECAUTIONS/ALLERGIES: NKDA  ASSESSMENT:  Patient is a 64year old female who was referred for speech evaluation due to stammering and stuttering. She reports that her issues with her speech started about 20 months ago. Patient reports that she was living with her sister but it wasn't a pleasant experience. She went to visit her parent's to let them know that she could no longer live with her sister and her parent's were not happy with that situation. About a week later, she began stuttering. She has now since moved out on her own but her stuttering comes and goes and depends on what she's doing and who's she's talking to she will stutter. She endorses that her speech is worse when she's anxious. Patient had shock therapy about 10 years ago. She had 11 shock therapies. She said she ultimately stopped because they were not working.        Based on the objective data described below, the patient presents with clinical  s/sx of prolongations and repetitions of sounds related to environmental stressors, significantly impacted by stress/anxiety. Oral motor exam was WNL's. She was given words to read to see if there was a pattern of specific sounds that she would stutter on in addition to figuring out if the stutter was on the initial, medial or final position of words. . She read a total of 50 words. She correctly read 30/50 words without stuttering like characteristics. At the sentence level, she read 10 sentences and had prolongations/repetitions of initial sound on 9/10 sentences. It appears that she has the most difficulty with /s/ and /g/ at the initial position. On occasion, she would prolng on /d/ and /l/ at the initial position. Patient present for therapy this date. No complaints. Much improved speech this date with minimal hesitancy. She was able to use her slowed speech and increased breathing during conversation. Hesitancy observed x's 1. Patient will benefit from skilled intervention to address the above impairments. ?????? ? ? This section established at most recent assessment??????????  PROBLEM LIST (Impairments causing functional limitations):  1. Fluency   GOALS: (Goals have been discussed and agreed upon with patient.)  SHORT-TERM FUNCTIONAL GOALS: Time Frame: 60 days  1. Patient will utilize breathing/relaxation techniques to help decrease the amount of prolongations at the word, phrase, sentence and conversational level at 80% accuracy. 2. Patient will utilize easy onset strategies at the words, phrase, sentence and conversational level at 80% accuracy. DISCHARGE GOALS: Time Frame: 2 months  1. Patientt will develop functional and intelligible speech and utilize compensatory strategies  through the use of adequate labial and lingual function, increased articulatory precision and  speech prosody  REHABILITATION POTENTIAL FOR STATED GOALS: GoodPLAN OF CARE:  INTERVENTIONS PLANNED: (Benefits and precautions of therapy have been discussed with the patient.)  1.  Speech therapy  TREATMENT PLAN EFFECTIVE DATES: 5/14/2021 TO 7/13/2021 (60 days). FREQUENCY/DURATION: Continue to follow patient 1 time a week for 60 days to address above goals. Regarding Alva Arango's therapy, I certify that the treatment plan above will be carried out by a therapist or under their direction. Thank you for this referral,  J CARLOS Huber. Ed CCC-SLP                   Referring Physician Signature: Mary Lou Garcia MD     Date      SUBJECTIVE:  Alert and pleasant   Present Symptoms: stuttering       Current Medications:   Current Outpatient Medications on File Prior to Encounter   Medication Sig Dispense Refill    ergocalciferol (CALCIFEROL) 200 mcg/mL (8,000 unit/mL) drops Take 2,000 mg by mouth.  naproxen (NAPROSYN) 500 mg tablet Take 440 mg by mouth.  nicotine (NICORETTE) 2 mg gum 2 mg by Buccal route.  buPROPion XL (WELLBUTRIN XL) 300 mg XL tablet Take 1 Tab by mouth every morning. 90 Tab 1    FLUoxetine (PROzac) 20 mg capsule Take 2 Caps by mouth daily. 180 Cap 4    lamoTRIgine (LaMICtaL) 100 mg tablet Take 1 Tab by mouth two (2) times a day. 180 Tab 1    traZODone (DESYREL) 100 mg tablet Take 1-2 Tabs by mouth nightly. 60 Tab 3    clonazePAM (KlonoPIN) 0.5 mg tablet Take 1 Tab by mouth daily as needed for Anxiety. 30 Tab 1    clonazePAM (KlonoPIN) 0.5 mg tablet Take 1 Tab by mouth two (2) times daily as needed (tremor). Max Daily Amount: 1 mg. 60 Tab 2    azelastine (ASTELIN) 137 mcg (0.1 %) nasal spray 1 [place] by Both Nostrils route two (2) times a day. 1 Bottle 5    levocetirizine (XYZAL) 5 mg tablet Take 1 Tab by mouth daily. 90 Tab 3    cholecalciferol (VITAMIN D3) 1,000 unit cap Take 1 Cap by mouth daily.  multivitamin (ONE A DAY) tablet Take 1 Tab by mouth daily. No current facility-administered medications on file prior to encounter.         Date Last Reviewed: 6/17/2021      Social History/Home Situation:        Work/Activity History: Does not work/ Disability     OBJECTIVE:  Objective Measure: Tool Used: Fluency:  Score:  Initial: 4 Most Recent: X (Date: -- )   Interpretation of Score: This measure should not be used for individuals who exhibit difficulty with rate and prosody as a result of a neurological impairment, cluttering, foreign dialect, or developmental disability. o Level 1:  Fluency is so disrupted that speech is often not functional for communication. Attempts at speech communication are extremely labored in all situations, which render the speaker virtually unintelligible. Alternative means of speaking are used most of the time. Listeners avoid spoken interaction with the individual.  o Level 2: Speech is functional most of the time, but labored in many day-to-day situations due to extended disruptions of speech flow which sometimes render the individual difficult to understand. Participation in vocational, avocational, and social activities requiring speech is reduced overall. Listener discomfort is evident throughout conversational interactions. o Level 3: Speech is functional. Dysfluencies are evident in all situations, but are particularly frequent in problem situations. Vocational, avocational, and social participation requiring speech is occasionally reduced overall, and significantly reduced within what the individual perceives as problem situations. Some listener discomfort is evident throughout interactions. o Level 4: Speech is functional for communication, but there is extreme situational variation. The frequency and severity of disruptions of speech flow within problem situations is distracting most but not all of the time. Vocational, avocational, and social participation requiring speech is limited most of the time in problem situations. Listeners are often aware of fluency difficulty. o Level 5: Speech is functional for communication, and fluency can be maintained in some situations. Self-monitoring is inconsistent.  The frequency and severity of disruptions of speech flow within problem situations is distracting some of the time. Speech difficulties are noticeable when they occur, and sometimes limit vocational, avocational, and social activities requiring speech in problem situations. Listeners are occasionally aware of fluency difficulties relative to particular situations. o Level 6: Speech is functional for communication, and fluency can be maintained most of the time. Self-monitoring is consistent. Vocational, avocational, and social activities requiring speech is not restricted most of the time. Listeners are infrequently aware of fluency difficulties even in problem situations. o Level 7: Disruptions in speech flow do not call attention to the speaker, and participation in activities requiring speech is not limited. May include self-monitoring as needed. Oral Motor Structure/Speech:     SPEECH-LANGUAGE COGNITIVE EVALUATION    Tests Given: Informal Assessment    Mental Status: Alert and pleasant     Verbal Expression:    Pragmatics: Emotional     Speech/Language Activities: Activities/Procedures listed utilized to improve expressive communication. Required moderate cueing to increase communication with family/caregivers. Participated in conversational starters using her speech strategies with hesitancy x's 1    __________________________________________________________________________________________________  History of Present Injury/Illness (Reason for Referral): Fluency  Treatment Assessment:  Evaluation completed. Progression/Medical Necessity:   · Patient is expected to demonstrate progress in expressive communication to increase independence with activities of daily living. Compliance with Program/Exercises: Will assess as treatment progresses}. Reason for Continuation of Services/Other Comments:  · Patient continues to require skilled intervention due to medical complications.   Recommendations/Intent for next treatment session: \"Treatment next visit will focus on goals\". Total Treatment Duration:  Time In: 1300  Time Out: 6019 [place], [place]. Rafael Centeno

## 2021-06-23 ENCOUNTER — HOSPITAL ENCOUNTER (OUTPATIENT)
Dept: PHYSICAL THERAPY | Age: 61
Discharge: HOME | End: 2021-06-23
Attending: PSYCHIATRY & NEUROLOGY
Payer: MEDICARE

## 2021-06-23 PROCEDURE — 92507 TX SP LANG VOICE COMM INDIV: CPT | Performed by: SPEECH-LANGUAGE PATHOLOGIST

## 2021-06-23 NOTE — PROGRESS NOTES
Aidee Paula  : 1960  [place]  Secondary:  [facility] [place] [address] [facility], [address] [place] [address],[address], [facility] [address]  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 Ryan Angel is a 64 y.o. female who  has a past medical history of Environmental allergies (10/10/2016), Hip strain (), Moderate episode of recurrent major depressive disorder ([facility] [address] (10/10/2016), Pure hypercholesterolemia (10/10/2016), and Sensory neuropathy, hereditary (10/10/2016). She also  has a past surgical history that includes hx other surgical.  MEDICAL/REFERRING DIAGNOSIS: speech  DATE OF ONSET: 20 months ago  PRIOR LEVEL OF FUNCTION: Independent with ADL's  PRECAUTIONS/ALLERGIES: NKDA  ASSESSMENT:  Patient is a 64year old female who was referred for speech evaluation due to stammering and stuttering. She reports that her issues with her speech started about 20 months ago. Patient reports that she was living with her sister but it wasn't a pleasant experience. She went to visit her parent's to let them know that she could no longer live with her sister and her parent's were not happy with that situation. About a week later, she began stuttering. She has now since moved out on her own but her stuttering comes and goes and depends on what she's doing and who's she's talking to she will stutter. She endorses that her speech is worse when she's anxious. Patient had shock therapy about 10 years ago. She had 11 shock therapies. She said she ultimately stopped because they were not working.        Based on the objective data described below, the patient presents with clinical  s/sx of prolongations and repetitions of sounds related to environmental stressors, significantly impacted by stress/anxiety. Oral motor exam was WNL's. She was given words to read to see if there was a pattern of specific sounds that she would stutter on in addition to figuring out if the stutter was on the initial, medial or final position of words. . She read a total of 50 words. She correctly read 30/50 words without stuttering like characteristics. At the sentence level, she read 10 sentences and had prolongations/repetitions of initial sound on 9/10 sentences. It appears that she has the most difficulty with /s/ and /g/ at the initial position. On occasion, she would prolng on /d/ and /l/ at the initial position. Patient present for therapy this date. No complaints. Much improved speech this date with minimal hesitancy. She was able to use her slowed speech and increased breathing during conversation. Hesitancy observed x's 1. Patient will benefit from skilled intervention to address the above impairments. ?????? ? ? This section established at most recent assessment??????????  PROBLEM LIST (Impairments causing functional limitations):  1. Fluency   GOALS: (Goals have been discussed and agreed upon with patient.)  SHORT-TERM FUNCTIONAL GOALS: Time Frame: 60 days  1. Patient will utilize breathing/relaxation techniques to help decrease the amount of prolongations at the word, phrase, sentence and conversational level at 80% accuracy. 2. Patient will utilize easy onset strategies at the words, phrase, sentence and conversational level at 80% accuracy. DISCHARGE GOALS: Time Frame: 2 months  1. Patientt will develop functional and intelligible speech and utilize compensatory strategies  through the use of adequate labial and lingual function, increased articulatory precision and  speech prosody  REHABILITATION POTENTIAL FOR STATED GOALS: GoodPLAN OF CARE:  INTERVENTIONS PLANNED: (Benefits and precautions of therapy have been discussed with the patient.)  1.  Speech therapy  TREATMENT PLAN EFFECTIVE DATES: 5/14/2021 TO 7/13/2021 (60 days). FREQUENCY/DURATION: Continue to follow patient 1 time a week for 60 days to address above goals. Regarding Alva Arango's therapy, I certify that the treatment plan above will be carried out by a therapist or under their direction. Thank you for this referral,  CEM Proctor Ed CCC-SLP                   Referring Physician Signature: Jamal Chase MD     Date      SUBJECTIVE:  Alert and pleasant   Present Symptoms: stuttering       Current Medications:   Current Outpatient Medications on File Prior to Encounter   Medication Sig Dispense Refill    ergocalciferol (CALCIFEROL) 200 mcg/mL (8,000 unit/mL) drops Take 2,000 mg by mouth.  naproxen (NAPROSYN) 500 mg tablet Take 440 mg by mouth.  nicotine (NICORETTE) 2 mg gum 2 mg by Buccal route.  buPROPion XL (WELLBUTRIN XL) 300 mg XL tablet Take 1 Tab by mouth every morning. 90 Tab 1    FLUoxetine (PROzac) 20 mg capsule Take 2 Caps by mouth daily. 180 Cap 4    lamoTRIgine (LaMICtaL) 100 mg tablet Take 1 Tab by mouth two (2) times a day. 180 Tab 1    traZODone (DESYREL) 100 mg tablet Take 1-2 Tabs by mouth nightly. 60 Tab 3    clonazePAM (KlonoPIN) 0.5 mg tablet Take 1 Tab by mouth daily as needed for Anxiety. 30 Tab 1    clonazePAM (KlonoPIN) 0.5 mg tablet Take 1 Tab by mouth two (2) times daily as needed (tremor). Max Daily Amount: 1 mg. 60 Tab 2    azelastine (ASTELIN) 137 mcg (0.1 %) nasal spray 1 [place] by Both Nostrils route two (2) times a day. 1 Bottle 5    levocetirizine (XYZAL) 5 mg tablet Take 1 Tab by mouth daily. 90 Tab 3    cholecalciferol (VITAMIN D3) 1,000 unit cap Take 1 Cap by mouth daily.  multivitamin (ONE A DAY) tablet Take 1 Tab by mouth daily. No current facility-administered medications on file prior to encounter.         Date Last Reviewed: 6/23/2021      Social History/Home Situation:        Work/Activity History: Does not work/ Disability     OBJECTIVE:  Objective Measure: Tool Used: Fluency:  Score:  Initial: 4 Most Recent: X (Date: -- )   Interpretation of Score: This measure should not be used for individuals who exhibit difficulty with rate and prosody as a result of a neurological impairment, cluttering, foreign dialect, or developmental disability. o Level 1:  Fluency is so disrupted that speech is often not functional for communication. Attempts at speech communication are extremely labored in all situations, which render the speaker virtually unintelligible. Alternative means of speaking are used most of the time. Listeners avoid spoken interaction with the individual.  o Level 2: Speech is functional most of the time, but labored in many day-to-day situations due to extended disruptions of speech flow which sometimes render the individual difficult to understand. Participation in vocational, avocational, and social activities requiring speech is reduced overall. Listener discomfort is evident throughout conversational interactions. o Level 3: Speech is functional. Dysfluencies are evident in all situations, but are particularly frequent in problem situations. Vocational, avocational, and social participation requiring speech is occasionally reduced overall, and significantly reduced within what the individual perceives as problem situations. Some listener discomfort is evident throughout interactions. o Level 4: Speech is functional for communication, but there is extreme situational variation. The frequency and severity of disruptions of speech flow within problem situations is distracting most but not all of the time. Vocational, avocational, and social participation requiring speech is limited most of the time in problem situations. Listeners are often aware of fluency difficulty. o Level 5: Speech is functional for communication, and fluency can be maintained in some situations. Self-monitoring is inconsistent.  The frequency and severity of disruptions of speech flow within problem situations is distracting some of the time. Speech difficulties are noticeable when they occur, and sometimes limit vocational, avocational, and social activities requiring speech in problem situations. Listeners are occasionally aware of fluency difficulties relative to particular situations. o Level 6: Speech is functional for communication, and fluency can be maintained most of the time. Self-monitoring is consistent. Vocational, avocational, and social activities requiring speech is not restricted most of the time. Listeners are infrequently aware of fluency difficulties even in problem situations. o Level 7: Disruptions in speech flow do not call attention to the speaker, and participation in activities requiring speech is not limited. May include self-monitoring as needed. Oral Motor Structure/Speech:     SPEECH-LANGUAGE COGNITIVE EVALUATION    Tests Given: Informal Assessment    Mental Status: Alert and pleasant     Verbal Expression:    Pragmatics: Emotional     Speech/Language Activities: Activities/Procedures listed utilized to improve expressive communication. Required moderate cueing to increase communication with family/caregivers. Participated in conversational starters using her speech strategies with hesitancy x's 1    __________________________________________________________________________________________________  History of Present Injury/Illness (Reason for Referral): Fluency  Treatment Assessment:  Evaluation completed. Progression/Medical Necessity:   · Patient is expected to demonstrate progress in expressive communication to increase independence with activities of daily living. Compliance with Program/Exercises: Will assess as treatment progresses}. Reason for Continuation of Services/Other Comments:  · Patient continues to require skilled intervention due to medical complications.   Recommendations/Intent for next treatment session: \"Treatment next visit will focus on goals\". Total Treatment Duration:  Time In: 1300  Time Out: [facility], Lizette Monday. Sravan Claire

## 2021-06-29 ENCOUNTER — APPOINTMENT (OUTPATIENT)
Dept: PHYSICAL THERAPY | Age: 61
End: 2021-06-29
Attending: PSYCHIATRY & NEUROLOGY
Payer: MEDICARE

## 2021-07-22 ENCOUNTER — APPOINTMENT (OUTPATIENT)
Dept: PHYSICAL THERAPY | Age: 61
End: 2021-07-22
Attending: PSYCHIATRY & NEUROLOGY

## 2021-08-20 NOTE — PROGRESS NOTES
Aidee Paula  : 1960  [place]  Secondary:  [facility] at [address], [facility] [address]  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 Ryan Angel is a 64 y.o. female who  has a past medical history of Environmental allergies (10/10/2016), Hip strain (), Moderate episode of recurrent major depressive disorder ([facility] [address] (10/10/2016), Pure hypercholesterolemia (10/10/2016), and Sensory neuropathy, hereditary (10/10/2016). She also  has a past surgical history that includes hx other surgical.  MEDICAL/REFERRING DIAGNOSIS: speech  DATE OF ONSET: 20 months ago  PRIOR LEVEL OF FUNCTION: Independent with ADL's  PRECAUTIONS/ALLERGIES: NKDA  ASSESSMENT:  Patient is a 64year old female who was referred for speech evaluation due to stammering and stuttering. She reports that her issues with her speech started about 20 months ago. Patient reports that she was living with her sister but it wasn't a pleasant experience. She went to visit her parent's to let them know that she could no longer live with her sister and her parent's were not happy with that situation. About a week later, she began stuttering. She has now since moved out on her own but her stuttering comes and goes and depends on what she's doing and who's she's talking to she will stutter. She endorses that her speech is worse when she's anxious. Patient had shock therapy about 10 years ago. She had 11 shock therapies. She said she ultimately stopped because they were not working.        Based on the objective data described below, the patient presents with clinical  s/sx of prolongations and repetitions of sounds related to environmental stressors, significantly impacted by stress/anxiety. Oral motor exam was WNL's. She was given words to read to see if there was a pattern of specific sounds that she would stutter on in addition to figuring out if the stutter was on the initial, medial or final position of words. . She read a total of 50 words. She correctly read 30/50 words without stuttering like characteristics. At the sentence level, she read 10 sentences and had prolongations/repetitions of initial sound on 9/10 sentences. It appears that she has the most difficulty with /s/ and /g/ at the initial position. On occasion, she would prolng on /d/ and /l/ at the initial position. Patient attended 5 treatment addressing her speech hesitations due to increased stress and anxiety. She was doing well initiating breathing and relaxation strategies to help decrease her hesitations, prolongations and repetition of speech sounds. I do feel she's independent with HEP therefore this will serve as her discharge summary. ?????? ? ? This section established at most recent assessment??????????  PROBLEM LIST (Impairments causing functional limitations):  1. Fluency   GOALS: (Goals have been discussed and agreed upon with patient.)  SHORT-TERM FUNCTIONAL GOALS: Time Frame: 60 days  1. Patient will utilize breathing/relaxation techniques to help decrease the amount of prolongations at the word, phrase, sentence and conversational level at 80% accuracy. Met   2. Patient will utilize easy onset strategies at the words, phrase, sentence and conversational level at 80% accuracy. Met   DISCHARGE GOALS: Time Frame: 2 months  1. Patientt will develop functional and intelligible speech and utilize compensatory strategies  through the use of adequate labial and lingual function, increased articulatory precision and  speech prosody.  Met   REHABILITATION POTENTIAL FOR STATED GOALS: GoodSUBJECTIVE:  Alert and pleasant   Present Symptoms: stuttering Current Medications:   Current Outpatient Medications on File Prior to Encounter   Medication Sig Dispense Refill    ergocalciferol (CALCIFEROL) 200 mcg/mL (8,000 unit/mL) drops Take 2,000 mg by mouth.  naproxen (NAPROSYN) 500 mg tablet Take 440 mg by mouth.  nicotine (NICORETTE) 2 mg gum 2 mg by Buccal route.  buPROPion XL (WELLBUTRIN XL) 300 mg XL tablet Take 1 Tab by mouth every morning. 90 Tab 1    FLUoxetine (PROzac) 20 mg capsule Take 2 Caps by mouth daily. 180 Cap 4    lamoTRIgine (LaMICtaL) 100 mg tablet Take 1 Tab by mouth two (2) times a day. 180 Tab 1    traZODone (DESYREL) 100 mg tablet Take 1-2 Tabs by mouth nightly. 60 Tab 3    clonazePAM (KlonoPIN) 0.5 mg tablet Take 1 Tab by mouth daily as needed for Anxiety. 30 Tab 1    clonazePAM (KlonoPIN) 0.5 mg tablet Take 1 Tab by mouth two (2) times daily as needed (tremor). Max Daily Amount: 1 mg. 60 Tab 2    azelastine (ASTELIN) 137 mcg (0.1 %) nasal spray 1 [place] by Both Nostrils route two (2) times a day. 1 Bottle 5    levocetirizine (XYZAL) 5 mg tablet Take 1 Tab by mouth daily. 90 Tab 3    cholecalciferol (VITAMIN D3) 1,000 unit cap Take 1 Cap by mouth daily.  multivitamin (ONE A DAY) tablet Take 1 Tab by mouth daily. No current facility-administered medications on file prior to encounter. Date Last Reviewed: 6/23/2021      Social History/Home Situation:        Work/Activity History: Does not work/ Disability     OBJECTIVE:  Objective Measure: Tool Used: Fluency:  Score:  Initial: 4 Most Recent: X (Date: -- )   Interpretation of Score: This measure should not be used for individuals who exhibit difficulty with rate and prosody as a result of a neurological impairment, cluttering, foreign dialect, or developmental disability. o Level 1:  Fluency is so disrupted that speech is often not functional for communication.   Attempts at speech communication are extremely labored in all situations, which render the speaker virtually unintelligible. Alternative means of speaking are used most of the time. Listeners avoid spoken interaction with the individual.  o Level 2: Speech is functional most of the time, but labored in many day-to-day situations due to extended disruptions of speech flow which sometimes render the individual difficult to understand. Participation in vocational, avocational, and social activities requiring speech is reduced overall. Listener discomfort is evident throughout conversational interactions. o Level 3: Speech is functional. Dysfluencies are evident in all situations, but are particularly frequent in problem situations. Vocational, avocational, and social participation requiring speech is occasionally reduced overall, and significantly reduced within what the individual perceives as problem situations. Some listener discomfort is evident throughout interactions. o Level 4: Speech is functional for communication, but there is extreme situational variation. The frequency and severity of disruptions of speech flow within problem situations is distracting most but not all of the time. Vocational, avocational, and social participation requiring speech is limited most of the time in problem situations. Listeners are often aware of fluency difficulty. o Level 5: Speech is functional for communication, and fluency can be maintained in some situations. Self-monitoring is inconsistent. The frequency and severity of disruptions of speech flow within problem situations is distracting some of the time. Speech difficulties are noticeable when they occur, and sometimes limit vocational, avocational, and social activities requiring speech in problem situations. Listeners are occasionally aware of fluency difficulties relative to particular situations. o Level 6: Speech is functional for communication, and fluency can be maintained most of the time. Self-monitoring is consistent.  Vocational, avocational, and social activities requiring speech is not restricted most of the time. Listeners are infrequently aware of fluency difficulties even in problem situations. o Level 7: Disruptions in speech flow do not call attention to the speaker, and participation in activities requiring speech is not limited. May include self-monitoring as needed. CEM Morillo

## 2021-09-22 ENCOUNTER — TRANSCRIBE ORDER (OUTPATIENT)
Dept: SCHEDULING | Age: 61
End: 2021-09-22

## 2021-09-22 DIAGNOSIS — Z12.31 SCREENING MAMMOGRAM, ENCOUNTER FOR: Primary | ICD-10-CM

## 2021-10-26 ENCOUNTER — HOSPITAL ENCOUNTER (OUTPATIENT)
Dept: MAMMOGRAPHY | Age: 61
Discharge: HOME | End: 2021-10-26
Attending: INTERNAL MEDICINE
Payer: MEDICARE

## 2021-10-26 DIAGNOSIS — Z12.31 SCREENING MAMMOGRAM, ENCOUNTER FOR: ICD-10-CM

## 2021-10-26 PROCEDURE — 77063 BREAST TOMOSYNTHESIS BI: CPT

## 2021-10-28 ENCOUNTER — APPOINTMENT (OUTPATIENT)
Dept: ULTRASOUND IMAGING | Age: 61
End: 2021-10-28
Attending: EMERGENCY MEDICINE
Payer: MEDICARE

## 2021-10-28 ENCOUNTER — HOSPITAL ENCOUNTER (OUTPATIENT)
Dept: MRI IMAGING | Age: 61
Discharge: HOME | End: 2021-10-28
Attending: PSYCHIATRY & NEUROLOGY
Payer: MEDICARE

## 2021-10-28 ENCOUNTER — HOSPITAL ENCOUNTER (EMERGENCY)
Age: 61
Discharge: HOME | End: 2021-10-28
Attending: EMERGENCY MEDICINE | Admitting: EMERGENCY MEDICINE
Payer: MEDICARE

## 2021-10-28 VITALS
OXYGEN SATURATION: 98 % | TEMPERATURE: 98.4 F | HEART RATE: 78 BPM | BODY MASS INDEX: 19.56 KG/M2 | RESPIRATION RATE: 20 BRPM | SYSTOLIC BLOOD PRESSURE: 127 MMHG | HEIGHT: 69 IN | DIASTOLIC BLOOD PRESSURE: 63 MMHG | WEIGHT: 132.06 LBS

## 2021-10-28 DIAGNOSIS — M54.41 CHRONIC BILATERAL LOW BACK PAIN WITH BILATERAL SCIATICA: ICD-10-CM

## 2021-10-28 DIAGNOSIS — G89.29 CHRONIC BILATERAL LOW BACK PAIN WITH BILATERAL SCIATICA: ICD-10-CM

## 2021-10-28 DIAGNOSIS — T14.8XXA BRUISE OF MUSCLE: Primary | ICD-10-CM

## 2021-10-28 DIAGNOSIS — R29.898 WEAKNESS OF BOTH LOWER EXTREMITIES: ICD-10-CM

## 2021-10-28 DIAGNOSIS — M54.42 CHRONIC BILATERAL LOW BACK PAIN WITH BILATERAL SCIATICA: ICD-10-CM

## 2021-10-28 PROCEDURE — 99283 EMERGENCY DEPT VISIT LOW MDM: CPT

## 2021-10-28 PROCEDURE — 72148 MRI LUMBAR SPINE W/O DYE: CPT

## 2021-10-28 PROCEDURE — 93971 EXTREMITY STUDY: CPT

## 2021-10-28 NOTE — ED TRIAGE NOTES
Pt c/o intermittent right lower leg pain for several weeks. No swelling or increased warmth notes. Denies trauma or injury. States that she has a reddened area to the center of the calf.   Masked on arrival

## 2021-10-29 NOTE — ED NOTES
I have reviewed discharge instructions with the patient. The patient verbalized understanding. Patient left ED via Discharge Method: ambulatory to Home with self. Opportunity for questions and clarification provided. Patient given 0 scripts. To continue your aftercare when you leave the hospital, you may receive an automated call from our care team to check in on how you are doing. This is a free service and part of our promise to provide the best care and service to meet your aftercare needs.  If you have questions, or wish to unsubscribe from this service please call [phone]. Thank you for Choosing our [facility].

## 2021-10-29 NOTE — DISCHARGE INSTRUCTIONS
Return with any fevers, increased swelling, worsening symptoms, or additional concerns. Follow-up with your primary care doctor for reevaluation.
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
## 2022-03-19 PROBLEM — G24.9 DYSTONIA: Status: ACTIVE | Noted: 2020-02-01

## 2022-06-09 ENCOUNTER — TELEPHONE (OUTPATIENT)
Dept: PRIMARY CARE CLINIC | Facility: CLINIC | Age: 62
End: 2022-06-09

## 2022-06-11 DIAGNOSIS — F41.1 GENERALIZED ANXIETY DISORDER: Primary | ICD-10-CM

## 2022-06-11 RX ORDER — GABAPENTIN 300 MG/1
300 CAPSULE ORAL 3 TIMES DAILY
Qty: 90 CAPSULE | Refills: 2 | Status: SHIPPED | OUTPATIENT
Start: 2022-06-11 | End: 2022-07-13 | Stop reason: SDUPTHER

## 2022-06-11 RX ORDER — BUPROPION HYDROCHLORIDE 300 MG/1
300 TABLET ORAL EVERY MORNING
Qty: 30 TABLET | Refills: 1 | Status: SHIPPED | OUTPATIENT
Start: 2022-06-11 | End: 2022-07-13 | Stop reason: SDUPTHER

## 2022-06-11 RX ORDER — TRAZODONE HYDROCHLORIDE 100 MG/1
200 TABLET ORAL NIGHTLY
Qty: 60 TABLET | Refills: 1 | Status: SHIPPED | OUTPATIENT
Start: 2022-06-11 | End: 2022-07-13 | Stop reason: SDUPTHER

## 2022-06-11 RX ORDER — CLONAZEPAM 0.5 MG/1
0.5 TABLET ORAL DAILY PRN
Qty: 30 TABLET | Refills: 1 | Status: SHIPPED | OUTPATIENT
Start: 2022-06-11 | End: 2022-07-13 | Stop reason: SDUPTHER

## 2022-06-11 RX ORDER — LAMOTRIGINE 100 MG/1
100 TABLET ORAL DAILY
Qty: 30 TABLET | Refills: 2 | Status: SHIPPED | OUTPATIENT
Start: 2022-06-11 | End: 2022-07-13 | Stop reason: SDUPTHER

## 2022-07-12 RX ORDER — FLUOXETINE HYDROCHLORIDE 20 MG/1
CAPSULE ORAL
Qty: 62 CAPSULE | Refills: 0 | OUTPATIENT
Start: 2022-07-12

## 2022-07-13 ENCOUNTER — TELEMEDICINE (OUTPATIENT)
Dept: BEHAVIORAL/MENTAL HEALTH CLINIC | Age: 62
End: 2022-07-13
Payer: MEDICARE

## 2022-07-13 DIAGNOSIS — G89.4 CHRONIC PAIN DISORDER: ICD-10-CM

## 2022-07-13 DIAGNOSIS — F41.1 GENERALIZED ANXIETY DISORDER: ICD-10-CM

## 2022-07-13 DIAGNOSIS — F51.05 INSOMNIA DUE TO MENTAL DISORDER: ICD-10-CM

## 2022-07-13 DIAGNOSIS — F33.0 MILD EPISODE OF RECURRENT MAJOR DEPRESSIVE DISORDER (HCC): Primary | ICD-10-CM

## 2022-07-13 PROCEDURE — 3017F COLORECTAL CA SCREEN DOC REV: CPT | Performed by: PSYCHIATRY & NEUROLOGY

## 2022-07-13 PROCEDURE — G8428 CUR MEDS NOT DOCUMENT: HCPCS | Performed by: PSYCHIATRY & NEUROLOGY

## 2022-07-13 PROCEDURE — 99214 OFFICE O/P EST MOD 30 MIN: CPT | Performed by: PSYCHIATRY & NEUROLOGY

## 2022-07-13 RX ORDER — TRAZODONE HYDROCHLORIDE 100 MG/1
200 TABLET ORAL NIGHTLY
Qty: 60 TABLET | Refills: 5 | Status: SHIPPED | OUTPATIENT
Start: 2022-07-13 | End: 2022-10-01 | Stop reason: SDUPTHER

## 2022-07-13 RX ORDER — LAMOTRIGINE 100 MG/1
100 TABLET ORAL DAILY
Qty: 30 TABLET | Refills: 5 | Status: SHIPPED | OUTPATIENT
Start: 2022-07-13 | End: 2022-10-01 | Stop reason: SDUPTHER

## 2022-07-13 RX ORDER — BUPROPION HYDROCHLORIDE 300 MG/1
300 TABLET ORAL EVERY MORNING
Qty: 30 TABLET | Refills: 5 | Status: SHIPPED | OUTPATIENT
Start: 2022-07-13 | End: 2022-10-01 | Stop reason: SDUPTHER

## 2022-07-13 RX ORDER — GABAPENTIN 300 MG/1
300 CAPSULE ORAL 3 TIMES DAILY
Qty: 90 CAPSULE | Refills: 5 | Status: SHIPPED | OUTPATIENT
Start: 2022-07-13 | End: 2023-01-09

## 2022-07-13 RX ORDER — CLONAZEPAM 0.5 MG/1
0.5 TABLET ORAL DAILY PRN
Qty: 30 TABLET | Refills: 5 | Status: SHIPPED | OUTPATIENT
Start: 2022-07-13 | End: 2023-01-09

## 2022-07-13 ASSESSMENT — PATIENT HEALTH QUESTIONNAIRE - PHQ9
6. FEELING BAD ABOUT YOURSELF - OR THAT YOU ARE A FAILURE OR HAVE LET YOURSELF OR YOUR FAMILY DOWN: 0
SUM OF ALL RESPONSES TO PHQ QUESTIONS 1-9: 8
2. FEELING DOWN, DEPRESSED OR HOPELESS: 1
7. TROUBLE CONCENTRATING ON THINGS, SUCH AS READING THE NEWSPAPER OR WATCHING TELEVISION: 1
SUM OF ALL RESPONSES TO PHQ QUESTIONS 1-9: 8
4. FEELING TIRED OR HAVING LITTLE ENERGY: 1
SUM OF ALL RESPONSES TO PHQ9 QUESTIONS 1 & 2: 4
SUM OF ALL RESPONSES TO PHQ QUESTIONS 1-9: 8
8. MOVING OR SPEAKING SO SLOWLY THAT OTHER PEOPLE COULD HAVE NOTICED. OR THE OPPOSITE, BEING SO FIGETY OR RESTLESS THAT YOU HAVE BEEN MOVING AROUND A LOT MORE THAN USUAL: 1
5. POOR APPETITE OR OVEREATING: 1
3. TROUBLE FALLING OR STAYING ASLEEP: 0
10. IF YOU CHECKED OFF ANY PROBLEMS, HOW DIFFICULT HAVE THESE PROBLEMS MADE IT FOR YOU TO DO YOUR WORK, TAKE CARE OF THINGS AT HOME, OR GET ALONG WITH OTHER PEOPLE: 1
1. LITTLE INTEREST OR PLEASURE IN DOING THINGS: 3
9. THOUGHTS THAT YOU WOULD BE BETTER OFF DEAD, OR OF HURTING YOURSELF: 0
SUM OF ALL RESPONSES TO PHQ QUESTIONS 1-9: 8

## 2022-07-13 ASSESSMENT — ANXIETY QUESTIONNAIRES
2. NOT BEING ABLE TO STOP OR CONTROL WORRYING: 0
7. FEELING AFRAID AS IF SOMETHING AWFUL MIGHT HAPPEN: 0
3. WORRYING TOO MUCH ABOUT DIFFERENT THINGS: 0
4. TROUBLE RELAXING: 0
GAD7 TOTAL SCORE: 2
1. FEELING NERVOUS, ANXIOUS, OR ON EDGE: 2
IF YOU CHECKED OFF ANY PROBLEMS ON THIS QUESTIONNAIRE, HOW DIFFICULT HAVE THESE PROBLEMS MADE IT FOR YOU TO DO YOUR WORK, TAKE CARE OF THINGS AT HOME, OR GET ALONG WITH OTHER PEOPLE: SOMEWHAT DIFFICULT
5. BEING SO RESTLESS THAT IT IS HARD TO SIT STILL: 0
6. BECOMING EASILY ANNOYED OR IRRITABLE: 0

## 2022-07-13 NOTE — PROGRESS NOTES
Patient:  Taryn Vital  Age:  63 y.o.  :  1960     SEX:  female MRN:  [id number]     RACE: [unfilled]     SEEN:  [x]  PATIENT  []  SPOUSE []  OTHER:              PHQ-9  2022   Little interest or pleasure in doing things 3 - -   Little interest or pleasure in doing things - 0 2   Feeling down, depressed, or hopeless 1 - -   Trouble falling or staying asleep, or sleeping too much 0 - -   Trouble falling or staying asleep, or sleeping too much - 0 1   Feeling tired or having little energy 1 - -   Feeling tired or having little energy - 0 1   Poor appetite or overeating 1 - -   Poor appetite, weight loss, or overeating - 1 0   Feeling bad about yourself - or that you are a failure or have let yourself or your family down 0 - -   Feeling bad about yourself - or that you are a failure or have let yourself or your family down - 0 1   Trouble concentrating on things, such as reading the newspaper or watching television 1 - -   Trouble concentrating on things such as school, work, reading, or watching TV - 1 0   Moving or speaking so slowly that other people could have noticed. Or the opposite - being so fidgety or restless that you have been moving around a lot more than usual 1 - -   Moving or speaking so slowly that other people could have noticed; or the opposite being so fidgety that others notice - 0 3   Thoughts that you would be better off dead, or of hurting yourself in some way 0 - -   Thoughts of being better off dead, or hurting yourself in some way - 0 0   PHQ-2 Score 4 - -   Total Score PHQ 2 - 1 4   PHQ-9 Total Score 8 - -   PHQ 9 Score - 3 10   If you checked off any problems, how difficult have these problems made it for you to do your work, take care of things at home, or get along with other people?  1 - -   How difficult have these problems made it for you to do your work, take care of your home and get along with others - Not difficult at all Very difficult GAURAV-7 SCREENING 7/13/2022 1/13/2022 9/22/2021   Feeling nervous, anxious, or on edge More than half the days - -   Not being able to stop or control worrying Not at all - -   Worrying too much about different things Not at all - -   Trouble relaxing Not at all - -   Being so restless that it is hard to sit still Not at all - -   Becoming easily annoyed or irritable Not at all - -   Feeling afraid as if something awful might happen Not at all - -   GAURAV-7 Total Score 2 - -   How difficult have these problems made it for you to do your work, take care of things at home, or get along with other people? Somewhat difficult Not difficult at all Not difficult at all   Feeling nervous, anxious, or on edge - Not at all Several days   GAURAV-7 Total Score - 1 2        I was at home while conducting this encounter. Consent:  She and/or her healthcare decision maker is aware that this patient-initiated Telehealth encounter is a billable service, with coverage as determined by her insurance carrier. She is aware that she may receive a bill and has provided verbal consent to proceed: YesPatient identification was verified, and a caregiver was present when appropriate. The patient was located in a state where the provider was credentialed to provide care. This virtual visit was conducted via [employer]. Pursuant to the emergency declaration under the [facility], [facility] waiver authority and the [place] [employer] General Act, this Virtual  Visit was conducted to reduce the patient's risk of exposure to COVID-19 and provide continuity of care for an established patient. Services were provided through a video synchronous discussion virtually to substitute for in-person clinic visit. Due to this being a TeleHealth evaluation, many elements of the physical examination are unable to be assessed. Total Time: minutes: 11-20 minutes.     Chief complaint:  Pt says she has been having back pain and that is keeping her depressed. Subjective:  Seen virtually for follow-up. States having back pain and that is keeping her depressed. Yesterday got cortisone shots in her back. Wants to know about delta 8 Gummies. Has THC a small amount. Advised her to make a choice and that I personally would not recommend any form of THC for now. States her parents are in [place] and she could not go to see them because of back pain. Her sisters are with them right now. They come back in September. Supportive psychotherapy provided. She denies suicidal ideation/homicidal ideations. Denies symptoms psychosis. Patient Active Problem List   Diagnosis    Sensory neuropathy, hereditary    Moderate episode of recurrent major depressive disorder ([facility] [address]    Environmental allergies    Dystonia    Pure hypercholesterolemia       Denies palpitation,SOB, Chest pain, headaches. In no acute distress. MEDICATION REVIEW:    Current Medications:    Current Outpatient Medications   Medication Sig    Multiple Vitamin (MULTIVITAMIN ADULT PO) Take 1 tablet by mouth daily    lamoTRIgine (LAMICTAL) 100 MG tablet Take 1 tablet by mouth daily    gabapentin (NEURONTIN) 300 MG capsule Take 1 capsule by mouth 3 times daily for 180 days.  clonazePAM (KLONOPIN) 0.5 MG tablet Take 1 tablet by mouth daily as needed for Anxiety for up to 180 days.     buPROPion (WELLBUTRIN XL) 300 MG extended release tablet Take 1 tablet by mouth every morning    traZODone (DESYREL) 100 MG tablet Take 2 tablets by mouth nightly    acetaminophen (TYLENOL) 500 MG tablet Take by mouth every 6 hours as needed    azelastine (ASTELIN) 0.1 % nasal spray 1 spray by Nasal route 2 times daily    FLUoxetine (PROZAC) 20 MG capsule Take 40 mg by mouth daily    levocetirizine (XYZAL) 5 MG tablet Take 5 mg by mouth daily    nicotine polacrilex (NICORETTE) 2 MG gum Place 2 mg inside cheek    vitamin D 25 MCG (1000 UT) PRESENT         SUICIDAL IDEATION  [x] DENIES  [] PRESENT W/O PLAN  [] PRESENT W/ PLAN       HOMICIDAL IDEATION  [x] DENIES  [] PRESENT W/O PLAN  [] PRESENT W/ PLAN           JUDGEMENT:   [x] GOOD   [] FAIR   [] POOR   INSIGHT:   [x] GOOD   [] FAIR   [] POOR     COGNITION:           SENSORIUM:   [x] ALERT   [] CLOUDED   [] DROWSY     ORIENTATION:   [x] INTACT   [] TIME:  PLACE  PERSON   RECENT & REMOTE MEMORY:   [] NORMAL   [x] OTHER:                  ATTENTION:   [] INTACT   [x] MILD IMPAIRMENT   [] SEVERE IMPAIRMENT     CONCENTRATION:   [] INTACT   [x] MILD IMPAIRMENT   [] SEVERE IMPAIRMENT     LANGUAGE:   [x] AVERAGE   [] ABOVE AVERAGE   [] BELOW AVERAGE     FUND OF KNOWLEDGE:   [] UNABLE TO ASSESS AT THIS TIME   [x] AVERAGE   [] ABOVE AVERAGE   [] BELOW AVERAGE      [] GOOD TO EXCELLENT KNOWLEDGE OF CURRENT EVENTS   [] POOR TO NO KNLEDGE OF CURRENT EVENTS           ABNORMAL MOVEMENTS:   [x] NONE   [] TICS   [] TREMORS   [] BIZZARE      [] FACE   [] TRUNK   [] EXTREMETIES   [] GESTURES        SLEEP:   [] GOOD   [x] FAIR   [] POOR      MUSCLE STRENGTH AND TONE   [x] WNL   [] ATROPHY   [] SPASTIC        [] FLACCID   [] COGWHEEL         Diagnoses/Impressions:    ICD-10-CM    1. Mild episode of recurrent major depressive disorder ([facility] [address]  F33.0    2. Generalized anxiety disorder  F41.1 clonazePAM (KLONOPIN) 0.5 MG tablet   3. Insomnia due to mental disorder  F51.05    4. Chronic pain disorder  G89.4        TREATMENT GOALS:  Symptom reduction, Medication adherence, maintain therapeutic gains    LABS/IMAGING:    []  Ordered [x]  Reviewed []  New Labs Ordered:     LAB    Please refer to the lab tab in the epic and care everywhere for the most recent lab results. Plan:     [x]  Medication ordered: Wellbutrin, Klonopin, gabapentin, Lamictal, trazodone to target depression, anxiety, insomnia.      [x]  Medication education/counseling provided  Medication dosage and time to take, purpose/expected benefits/risks, common side effects, lab monitoring required and reason, expected length of treatment, risk of no treatment, effects on pregnancy/nursing, financial availability. Educated patient on  side effects/risks/benefits of meds including cardiac arrhythmias, suicidal ideations,  orthostatic hypotension, serotonin syndrome, risk of chapo/hypomania from antidepressants, withdrawals from abrupt discontinuation of meds, risk of rash, risk of infection, risk of bleeding, risk of seizures, addiction potential, memory impairment with long term use of benzos, respiratory depression, high blood pressure, dizziness, drowsiness, sedation , risk of falls, Risk & benefits discussed: including but not limited to possible off-label medication usage. [x] Follow MSE for sxs improvement     I have reviewed the patients controlled substance prescription history, as maintained in the [place] prescription monitoring program, so that the prescription(s) for a  controlled substance can be given. Recommendations and Referrals: Follow up with : MD, requires monitoring of response to medication, requires monitoring of medication side effects. Time until next PMA:     Follow up with Mental Health Clinicians: psychotherapy interventions, improve level of functioning, monitoring to prevent decompensation /hospitalization, monitoring to maintain therapeutic gains, symptoms (resolving and controlled)           Psychotherapy note:                                __10_ Minutes of psychotherapy     [x]  Supportive psychotherapy, Patient discussed certain situational and personal stressors ongoing in her life at this time, weight management d/w the patient. Sleep hygiene d/w patient. Patient allowed to vent out her emotions. Scenarios were reviewed using role playing and CBT techniques in order to increase insight and decrease anxiety.        []  Disposition planning      []  Dangerous and will not contract for safety in the community    **Pateint has been notified: They are to call 911 or go to their nearest E.R. if they are experiencing a medical emergency or suicidal ideations/homicidal ideations. **  All ancillary documentation entered reviewed by provider. PLEASE NOTE:  This document has been produced in part or whole using voice recognition software. Proofread however unrecognized errors in transcription may be present. Keri Lobato MD          Had cortisone shots in back yesterday. Patient wants to discuss Delta 8 gummies.

## 2022-07-19 ENCOUNTER — PATIENT MESSAGE (OUTPATIENT)
Dept: BEHAVIORAL/MENTAL HEALTH CLINIC | Age: 62
End: 2022-07-19

## 2022-07-19 NOTE — TELEPHONE ENCOUNTER
From: Liza Leon  To: Dr. Giovanni Khanna  Sent: 7/19/2022 1:39 PM EDT  Subject: medication    Hi Dr. Tala Mcdonough,    Based on my medications can you indicate whether or not I'm suitable for plasmapheresis? If so, could you sign   a simple document for me? The plasma center requires it. I would like to give plasma to help others and supplement my income. Please let me know what your decision is. Thank you.  [employer]   [phone]
Spoke with patient, this is not something that Dr Ryan Adams does. Plasma center will need to make that determination or patient may need to speak with PCP for this type of clearance.
No risk alerts present

## 2022-07-21 ENCOUNTER — OFFICE VISIT (OUTPATIENT)
Dept: NEUROLOGY | Age: 62
End: 2022-07-21
Payer: MEDICARE

## 2022-07-21 VITALS
SYSTOLIC BLOOD PRESSURE: 114 MMHG | BODY MASS INDEX: 19.55 KG/M2 | DIASTOLIC BLOOD PRESSURE: 61 MMHG | WEIGHT: 132 LBS | HEART RATE: 71 BPM | HEIGHT: 69 IN

## 2022-07-21 DIAGNOSIS — G24.3 CERVICAL DYSTONIA: Primary | ICD-10-CM

## 2022-07-21 PROCEDURE — 3017F COLORECTAL CA SCREEN DOC REV: CPT | Performed by: PSYCHIATRY & NEUROLOGY

## 2022-07-21 PROCEDURE — 95874 GUIDE NERV DESTR NEEDLE EMG: CPT | Performed by: PSYCHIATRY & NEUROLOGY

## 2022-07-21 PROCEDURE — 64616 CHEMODENERV MUSC NECK DYSTON: CPT | Performed by: PSYCHIATRY & NEUROLOGY

## 2022-07-21 PROCEDURE — G8420 CALC BMI NORM PARAMETERS: HCPCS | Performed by: PSYCHIATRY & NEUROLOGY

## 2022-07-21 PROCEDURE — 99213 OFFICE O/P EST LOW 20 MIN: CPT | Performed by: PSYCHIATRY & NEUROLOGY

## 2022-07-21 PROCEDURE — 1036F TOBACCO NON-USER: CPT | Performed by: PSYCHIATRY & NEUROLOGY

## 2022-07-21 PROCEDURE — G8428 CUR MEDS NOT DOCUMENT: HCPCS | Performed by: PSYCHIATRY & NEUROLOGY

## 2022-07-21 NOTE — PROGRESS NOTES
Jony Johnston  [address], [address] [facility], [address] [place] [address]  Phone: [phone] Fax [phone]  Dr. Deveron Schlatter        Patient: Loni Kim  Provider: Deveron Schlatter, MD     Procedure note: Botulinum Toxin injections     Indication: Cervical Dystonia        Subjective:      Mrs. Elvis Villanueva is a pleasant female with history of head tremor for several years. It is jerky and she feels a \"pulling\" sensation of the left ear towards the shoulder. No sensory tricks. Not much pain or stiffness. She has had several injections with us in the past.  Overall the injections have been met with benefit for her head jerking. She denies any adverse effects. She has since followed up with pain management regarding her low back pain and they have been doing facet injections which have been helpful. Past medical history, surgical history, social history, family history, medications and allergies were all reviewed and updated as appropriate. Current Outpatient Medications on File Prior to Visit   Medication Sig Dispense Refill    Multiple Vitamin (MULTIVITAMIN ADULT PO) Take 1 tablet by mouth daily      lamoTRIgine (LAMICTAL) 100 MG tablet Take 1 tablet by mouth daily 30 tablet 5    gabapentin (NEURONTIN) 300 MG capsule Take 1 capsule by mouth 3 times daily for 180 days. 90 capsule 5    clonazePAM (KLONOPIN) 0.5 MG tablet Take 1 tablet by mouth daily as needed for Anxiety for up to 180 days.  30 tablet 5    buPROPion (WELLBUTRIN XL) 300 MG extended release tablet Take 1 tablet by mouth every morning 30 tablet 5    traZODone (DESYREL) 100 MG tablet Take 2 tablets by mouth nightly 60 tablet 5    acetaminophen (TYLENOL) 500 MG tablet Take by mouth every 6 hours as needed      azelastine (ASTELIN) 0.1 % nasal spray 1 spray by Nasal route 2 times daily      vitamin D 25 MCG (1000 UT) CAPS Take 1,000 Units by mouth daily (Patient not taking: Reported on 7/13/2022)      Ergocalciferol (DRISDOL) 200 MCG/ML casual gait with normal arm swing. Assessment/Plan:      Mrs. Venkat Camacho is a 63 y. o.female who presents for chemodenervation for cervical dystonia. - Proceed with botulinum toxin injections as noted below. - Continue follow up with pain management    - RTC 3 months for review and re-injection. Procedure:       Informed consent was obtained after the potential risks and benefits have been explained to the patient. Potential risks include:  Pain, bruising, bleeding, infection, flu-like symptoms, over-weakening of injected or adjacent muscles and swallowing dysfunction. Patient was given the botulinum toxin medication guide according to FDA standards. Procedure:  Using a 30 gauge 1.4 inch hollow lumen recording needle on a tuberculin syringe was used to inject bolutinum toxin in the muscles noted below, The following muscles were sampled utilizing EMG and injected as noted:       Injection:                                                        L splenius capitis 75 units  L splenius cervicis 50 units    R SCM   50 units       Total injected: 175 units BOTOX 100units/1 cc under EMG guidance. Waste: 25 units     Comments: There were no complications. The patient tolerated the procedure well. She will follow up in 3 months for review/re-injection. Signature: Armando Orourke MD  Date: 7/21/22  [facility]   [facility]  Ph: [phone]  Fax: [phone]       I have personally interviewed and examined Mr. Vista Bamberger and I have personally reviewed all relevant records including labs and imaging as noted above. I have written all aspects of this note. More than 50% of this time was used for counseling regarding my diagnosis, prognosis, and plans for management. Total injection time: 8 minutes   Total visit time: 32 minutes.

## 2022-08-01 ENCOUNTER — TELEPHONE (OUTPATIENT)
Dept: PRIMARY CARE CLINIC | Facility: CLINIC | Age: 62
End: 2022-08-01

## 2022-08-01 NOTE — TELEPHONE ENCOUNTER
Pt last office visit 07/13/2022 medication fluo xetin 20 mg is not at the pharmacy, please call pt back thank you.

## 2022-08-02 RX ORDER — FLUOXETINE HYDROCHLORIDE 20 MG/1
40 CAPSULE ORAL DAILY
Qty: 30 CAPSULE | Refills: 2 | Status: SHIPPED | OUTPATIENT
Start: 2022-08-02 | End: 2022-10-01 | Stop reason: SDUPTHER

## 2022-09-28 ENCOUNTER — TELEPHONE (OUTPATIENT)
Dept: BEHAVIORAL/MENTAL HEALTH CLINIC | Age: 62
End: 2022-09-28

## 2022-09-28 NOTE — TELEPHONE ENCOUNTER
Received the following message from the patient. \"I'm thinking about trying [address] [place]. I'm still  having a hard time despite all my meds and cognitive therapy. Do you recommend it? I've been on meds for 40 years. \"

## 2022-09-29 RX ORDER — FLUOXETINE HYDROCHLORIDE 20 MG/1
40 CAPSULE ORAL DAILY
Qty: 60 CAPSULE | Refills: 0 | OUTPATIENT
Start: 2022-09-29

## 2022-09-30 ENCOUNTER — TELEPHONE (OUTPATIENT)
Dept: BEHAVIORAL/MENTAL HEALTH CLINIC | Age: 62
End: 2022-09-30

## 2022-09-30 NOTE — TELEPHONE ENCOUNTER
Patient is requesting for prescriptions to be resent as 90 days rather than 30 days as it is cheaper for her to get them that way. Next appt is 1/4

## 2022-10-01 RX ORDER — BUPROPION HYDROCHLORIDE 300 MG/1
300 TABLET ORAL EVERY MORNING
Qty: 90 TABLET | Refills: 1 | Status: SHIPPED | OUTPATIENT
Start: 2022-10-01

## 2022-10-01 RX ORDER — LAMOTRIGINE 100 MG/1
100 TABLET ORAL DAILY
Qty: 90 TABLET | Refills: 1 | Status: SHIPPED | OUTPATIENT
Start: 2022-10-01

## 2022-10-01 RX ORDER — TRAZODONE HYDROCHLORIDE 100 MG/1
200 TABLET ORAL NIGHTLY
Qty: 90 TABLET | Refills: 1 | Status: SHIPPED | OUTPATIENT
Start: 2022-10-01

## 2022-10-01 RX ORDER — FLUOXETINE HYDROCHLORIDE 20 MG/1
40 CAPSULE ORAL DAILY
Qty: 90 CAPSULE | Refills: 1 | Status: SHIPPED | OUTPATIENT
Start: 2022-10-01

## 2022-11-10 ENCOUNTER — OFFICE VISIT (OUTPATIENT)
Dept: NEUROLOGY | Age: 62
End: 2022-11-10
Payer: MEDICARE

## 2022-11-10 VITALS
BODY MASS INDEX: 19.55 KG/M2 | HEART RATE: 84 BPM | WEIGHT: 132 LBS | HEIGHT: 69 IN | DIASTOLIC BLOOD PRESSURE: 82 MMHG | SYSTOLIC BLOOD PRESSURE: 108 MMHG

## 2022-11-10 DIAGNOSIS — G24.3 CERVICAL DYSTONIA: Primary | ICD-10-CM

## 2022-11-10 PROCEDURE — 95874 GUIDE NERV DESTR NEEDLE EMG: CPT | Performed by: PSYCHIATRY & NEUROLOGY

## 2022-11-10 PROCEDURE — 64616 CHEMODENERV MUSC NECK DYSTON: CPT | Performed by: PSYCHIATRY & NEUROLOGY

## 2022-11-10 NOTE — LETTER
Informed Consent for Botulinumtoxina  (Botox)                                                                         [facility]#_[id number]    Patient Name: Hien Wilkinson             :_1960      I give consent for the following procedure(s)    Botulinumtoxina (Botox) injections to be performed by:    [x]   Dr. Bran Fish    []   Dr. Henry Smith    []   Dr. Norah Oneill    I understand that my provider will have the main responsibility for my care specific to procedure. I understand the the doctor may be fellows, residents or other qualified first assistant's may be involved in my procedure under the supervision of the above provider. My provider has explained to me my condition, the nature and purpose of each procedure, and the risks and types of complications involved, the potential benefits and drawbacks, potential problems related to recovery, the likelihood of success, the possible results of non-treatment,and the reasonable options or alternatives. I have had the chance to ask questions about the planned procedure and all my questions have been answered to my satisfaction. I have received no promises from anyone about the results that may be obtained from the procedure. All procedures involve risk. Routine risks include, but are not limited to, perforation and/or injury to nearby blood vessels, nerve and/or organs, infection, blood clots. Other risks may include paralysis and/or _____________________________________. I understand that the information I have received about the risks may not be fully completed and other less common risks.       ________________________________________Date:_______________  Patient Signature    ________________________________________Date:_______________  Provider Signature                         _______________________________________ Date:_______________             Witness Signature

## 2022-11-10 NOTE — PROGRESS NOTES
Jagjit Kent  [address], [address] [facility]  Phone: [phone] Fax [phone]  Dr. Tamia Braxton        Patient: Suzanne Signs  Provider: Tamia Braxton MD     Procedure note: Botulinum Toxin injections     Indication: Cervical Dystonia        Subjective:      Mrs. Andrés Briceno is a pleasant female with history of head tremor for several years. It is jerky and she feels a \"pulling\" sensation of the left ear towards the shoulder. No sensory tricks. Not much pain or stiffness. She has had several injections with us in the past.  Overall the injections have been met with benefit for her head jerking. She denies any adverse effects. She is overdue for injections and so has noted worsening breakthrough symptoms within the last few weeks. She has since followed up with pain management regarding her low back pain and they have been doing facet injections which have been helpful. Past medical history, surgical history, social history, family history, medications and allergies were all reviewed and updated as appropriate. Current Outpatient Medications on File Prior to Visit   Medication Sig Dispense Refill    FLUoxetine (PROZAC) 20 MG capsule Take 2 capsules by mouth daily 90 capsule 1    lamoTRIgine (LAMICTAL) 100 MG tablet Take 1 tablet by mouth daily 90 tablet 1    buPROPion (WELLBUTRIN XL) 300 MG extended release tablet Take 1 tablet by mouth every morning 90 tablet 1    traZODone (DESYREL) 100 MG tablet Take 2 tablets by mouth nightly 90 tablet 1    Multiple Vitamin (MULTIVITAMIN ADULT PO) Take 1 tablet by mouth daily      gabapentin (NEURONTIN) 300 MG capsule Take 1 capsule by mouth 3 times daily for 180 days. 90 capsule 5    clonazePAM (KLONOPIN) 0.5 MG tablet Take 1 tablet by mouth daily as needed for Anxiety for up to 180 days.  30 tablet 5    acetaminophen (TYLENOL) 500 MG tablet Take by mouth every 6 hours as needed      azelastine (ASTELIN) 0.1 % nasal spray 1 spray by Nasal route 2 times daily      vitamin D 25 MCG (1000 UT) CAPS Take 1,000 Units by mouth daily (Patient not taking: Reported on 7/13/2022)      Ergocalciferol (DRISDOL) 200 MCG/ML drops Take 2,000 mg by mouth (Patient not taking: Reported on 7/13/2022)      levocetirizine (XYZAL) 5 MG tablet Take 5 mg by mouth daily      naproxen (NAPROSYN) 500 MG tablet Take 440 mg by mouth (Patient not taking: Reported on 7/13/2022)      nicotine polacrilex (NICORETTE) 2 MG gum Place 2 mg inside cheek       No current facility-administered medications on file prior to visit. Exam:      Vitals:    11/10/22 1436   BP: 108/82   Pulse: 84     General Exam:  General - Well developed, well nourished, somewhat anxious appearing. HEENT - Normocephalic, atraumatic. Sclera anicteric. Oropharynx clear. Neck - Supple without masses  Cardiovascular - Regular rate and rhythm. No carotid bruits. Lungs - Non-labored breathing. Abdomen - Soft, nontender, nondistended. Extremities - +hammer toes and high arches bilaterally. Neurological Exam:      MS/Language/Speech - Alert. Oriented to person, place, and time. Frequent stammering noted. Speech otherwise clear. No vocal tremor. Cranial Nerves - PERRL. Eye movements full with normal pursuits. No nystagmus. Face was symmetric with good activation and normal sensation. Tongue and palate were midline. Shoulder shrug symmetric. Motor - There is a jerky head tremor noted throughout evaluation. Tendency for a slight right lateral shift with left turn and ?left head tilt at times. Extremity strength was full with exception bilateral hip flexors. There was no tremor of the hands. Tone was normal.      Sensory - Diminished to pinprick, vibration in the distal lower extremities. Cerebellar - No ataxia or dysmetria with FNF bilaterally. Reflexes (R/L): Biceps (3+/3+), Triceps (3+/3+), Brachioradialis (3+/3+), Patellar (3+/3+), Ankle (0+/0+).  Polanco's was negative and plantar responses were flexor. Gait - She can rise from a seated position without difficulty. She walks with a casual gait with normal arm swing. Assessment/Plan:      Mrs. Andrés Briceno is a 63 y. o.female who presents for chemodenervation for cervical dystonia. Proceed with botulinum toxin injections as noted below. Continue follow up with pain management    RTC 3 months for review and re-injection. Procedure:       Informed consent was obtained after the potential risks and benefits have been explained to the patient. Potential risks include:  Pain, bruising, bleeding, infection, flu-like symptoms, over-weakening of injected or adjacent muscles and swallowing dysfunction. Patient was given the botulinum toxin medication guide according to FDA standards. Procedure:  Using a 30 gauge 1.4 inch hollow lumen recording needle on a tuberculin syringe was used to inject bolutinum toxin in the muscles noted below, The following muscles were sampled utilizing EMG and injected as noted:       Injection:                                                        L splenius capitis 75 units  L splenius cervicis 50 units    R SCM   50 units       Total injected: 175 units BOTOX 100units/1 cc under EMG guidance. Waste: 25 units     Comments: There were no complications. The patient tolerated the procedure well. She will follow up in 3 months for review/re-injection. Signature: Cassandra Hanley MD  Date: 11/10/22  [facility]   [address]  Ph: [phone]  Fax: [phone]       I have personally interviewed and examined Mr. Palacios Signs and I have personally reviewed all relevant records including labs and imaging as noted above. I have written all aspects of this note. More than 50% of this time was used for counseling regarding my diagnosis, prognosis, and plans for management.      Total injection time: 12 minutes   Total visit time: N/A

## 2022-11-28 RX ORDER — FLUOXETINE HYDROCHLORIDE 20 MG/1
CAPSULE ORAL
Qty: 60 CAPSULE | Refills: 0 | OUTPATIENT
Start: 2022-11-28

## 2022-12-30 RX ORDER — FLUOXETINE HYDROCHLORIDE 20 MG/1
CAPSULE ORAL
Qty: 60 CAPSULE | Refills: 0 | OUTPATIENT
Start: 2022-12-30

## 2023-01-04 ENCOUNTER — TELEMEDICINE (OUTPATIENT)
Dept: BEHAVIORAL/MENTAL HEALTH CLINIC | Age: 63
End: 2023-01-04
Payer: MEDICARE

## 2023-01-04 DIAGNOSIS — F41.1 GENERALIZED ANXIETY DISORDER: ICD-10-CM

## 2023-01-04 DIAGNOSIS — G89.4 CHRONIC PAIN DISORDER: ICD-10-CM

## 2023-01-04 DIAGNOSIS — F51.05 INSOMNIA DUE TO MENTAL DISORDER: ICD-10-CM

## 2023-01-04 DIAGNOSIS — F33.1 MODERATE EPISODE OF RECURRENT MAJOR DEPRESSIVE DISORDER (HCC): Primary | ICD-10-CM

## 2023-01-04 PROCEDURE — G8427 DOCREV CUR MEDS BY ELIG CLIN: HCPCS | Performed by: PSYCHIATRY & NEUROLOGY

## 2023-01-04 PROCEDURE — 3017F COLORECTAL CA SCREEN DOC REV: CPT | Performed by: PSYCHIATRY & NEUROLOGY

## 2023-01-04 PROCEDURE — 99214 OFFICE O/P EST MOD 30 MIN: CPT | Performed by: PSYCHIATRY & NEUROLOGY

## 2023-01-04 RX ORDER — LAMOTRIGINE 150 MG/1
150 TABLET ORAL DAILY
Qty: 90 TABLET | Refills: 1 | Status: SHIPPED | OUTPATIENT
Start: 2023-01-04

## 2023-01-04 RX ORDER — BUPROPION HYDROCHLORIDE 300 MG/1
300 TABLET ORAL EVERY MORNING
Qty: 90 TABLET | Refills: 1 | Status: SHIPPED | OUTPATIENT
Start: 2023-01-04

## 2023-01-04 RX ORDER — FLUOXETINE HYDROCHLORIDE 20 MG/1
40 CAPSULE ORAL DAILY
Qty: 180 CAPSULE | Refills: 1 | Status: SHIPPED | OUTPATIENT
Start: 2023-01-04

## 2023-01-04 RX ORDER — DICLOFENAC SODIUM 75 MG/1
TABLET, DELAYED RELEASE ORAL
COMMUNITY
Start: 2022-12-30

## 2023-01-04 RX ORDER — CLONAZEPAM 0.5 MG/1
0.5 TABLET ORAL DAILY PRN
Qty: 30 TABLET | Refills: 2 | Status: SHIPPED | OUTPATIENT
Start: 2023-01-04 | End: 2023-04-04

## 2023-01-04 RX ORDER — TRAZODONE HYDROCHLORIDE 150 MG/1
300 TABLET ORAL NIGHTLY
Qty: 180 TABLET | Refills: 1 | Status: SHIPPED | OUTPATIENT
Start: 2023-01-04

## 2023-01-04 ASSESSMENT — ANXIETY QUESTIONNAIRES
1. FEELING NERVOUS, ANXIOUS, OR ON EDGE: 2
6. BECOMING EASILY ANNOYED OR IRRITABLE: 0
GAD7 TOTAL SCORE: 5
2. NOT BEING ABLE TO STOP OR CONTROL WORRYING: 1
5. BEING SO RESTLESS THAT IT IS HARD TO SIT STILL: 0
4. TROUBLE RELAXING: 0
3. WORRYING TOO MUCH ABOUT DIFFERENT THINGS: 1
IF YOU CHECKED OFF ANY PROBLEMS ON THIS QUESTIONNAIRE, HOW DIFFICULT HAVE THESE PROBLEMS MADE IT FOR YOU TO DO YOUR WORK, TAKE CARE OF THINGS AT HOME, OR GET ALONG WITH OTHER PEOPLE: SOMEWHAT DIFFICULT
7. FEELING AFRAID AS IF SOMETHING AWFUL MIGHT HAPPEN: 1

## 2023-01-04 ASSESSMENT — PATIENT HEALTH QUESTIONNAIRE - PHQ9
6. FEELING BAD ABOUT YOURSELF - OR THAT YOU ARE A FAILURE OR HAVE LET YOURSELF OR YOUR FAMILY DOWN: 2
9. THOUGHTS THAT YOU WOULD BE BETTER OFF DEAD, OR OF HURTING YOURSELF: 0
SUM OF ALL RESPONSES TO PHQ QUESTIONS 1-9: 15
5. POOR APPETITE OR OVEREATING: 0
8. MOVING OR SPEAKING SO SLOWLY THAT OTHER PEOPLE COULD HAVE NOTICED. OR THE OPPOSITE, BEING SO FIGETY OR RESTLESS THAT YOU HAVE BEEN MOVING AROUND A LOT MORE THAN USUAL: 2
SUM OF ALL RESPONSES TO PHQ9 QUESTIONS 1 & 2: 4
1. LITTLE INTEREST OR PLEASURE IN DOING THINGS: 2
2. FEELING DOWN, DEPRESSED OR HOPELESS: 2
SUM OF ALL RESPONSES TO PHQ QUESTIONS 1-9: 15
3. TROUBLE FALLING OR STAYING ASLEEP: 2
SUM OF ALL RESPONSES TO PHQ QUESTIONS 1-9: 15
SUM OF ALL RESPONSES TO PHQ QUESTIONS 1-9: 15
10. IF YOU CHECKED OFF ANY PROBLEMS, HOW DIFFICULT HAVE THESE PROBLEMS MADE IT FOR YOU TO DO YOUR WORK, TAKE CARE OF THINGS AT HOME, OR GET ALONG WITH OTHER PEOPLE: 2
4. FEELING TIRED OR HAVING LITTLE ENERGY: 3
7. TROUBLE CONCENTRATING ON THINGS, SUCH AS READING THE NEWSPAPER OR WATCHING TELEVISION: 2

## 2023-01-04 NOTE — PROGRESS NOTES
Patient:  Tara Walters  Age:  63 y.o.  :  1960     SEX:  female MRN:  [id number]     RACE: White (non-)     SEEN:  [x]  PATIENT  []  SPOUSE []  OTHER:              PHQ-9  2023   Little interest or pleasure in doing things 2 3 1   Little interest or pleasure in doing things - - 0   Feeling down, depressed, or hopeless 2 1 0   Trouble falling or staying asleep, or sleeping too much 2 0 0   Trouble falling or staying asleep, or sleeping too much - - 0   Feeling tired or having little energy 3 1 0   Feeling tired or having little energy - - 0   Poor appetite or overeating 0 1 1   Poor appetite, weight loss, or overeating - - 1   Feeling bad about yourself - or that you are a failure or have let yourself or your family down 2 0 0   Feeling bad about yourself - or that you are a failure or have let yourself or your family down - - 0   Trouble concentrating on things, such as reading the newspaper or watching television 2 1 1   Trouble concentrating on things such as school, work, reading, or watching TV - - 1   Moving or speaking so slowly that other people could have noticed. Or the opposite - being so fidgety or restless that you have been moving around a lot more than usual 2 1 0   Moving or speaking so slowly that other people could have noticed; or the opposite being so fidgety that others notice - - 0   Thoughts that you would be better off dead, or of hurting yourself in some way 0 0 -   Thoughts of being better off dead, or hurting yourself in some way - - 0   PHQ-2 Score 4 4 1   Total Score PHQ 2 - - 1   PHQ-9 Total Score 15 8 3   PHQ 9 Score - - 3   If you checked off any problems, how difficult have these problems made it for you to do your work, take care of things at home, or get along with other people?  2 1 -   How difficult have these problems made it for you to do your work, take care of your home and get along with others - - Not difficult at all GAURAV-7 SCREENING 1/4/2023 7/13/2022 1/13/2022   Feeling nervous, anxious, or on edge More than half the days More than half the days Not at all   Not being able to stop or control worrying Several days Not at all Not at all   Worrying too much about different things Several days Not at all Not at all   Trouble relaxing Not at all Not at all Not at all   Being so restless that it is hard to sit still Not at all Not at all Not at all   Becoming easily annoyed or irritable Not at all Not at all Not at all   Feeling afraid as if something awful might happen Several days Not at all Several days   GAURAV-7 Total Score 5 2 1   How difficult have these problems made it for you to do your work, take care of things at home, or get along with other people? Somewhat difficult Somewhat difficult Not difficult at all   Feeling nervous, anxious, or on edge - - Not at all   GAURAV-7 Total Score - - 1        I was at home while conducting this encounter. Consent:  She and/or her healthcare decision maker is aware that this patient-initiated Telehealth encounter is a billable service, with coverage as determined by her insurance carrier. She is aware that she may receive a bill and has provided verbal consent to proceed: YesPatient identification was verified, and a caregiver was present when appropriate. The patient was located in a state where the provider was credentialed to provide care. This virtual visit was conducted via [address] Pursuant to the emergency declaration under the [facility], [facility] waiver authority and the [employer] Act, this Virtual  Visit was conducted to reduce the patient's risk of exposure to COVID-19 and provide continuity of care for an established patient. Services were provided through a video synchronous discussion virtually to substitute for in-person clinic visit.   Due to this being a TeleHealth evaluation, many elements of the physical examination are unable to be assessed. Total Time: minutes: 11-20 minutes. Chief complaint:  Pt says she is hanging in there. Subjective:  Seen virtually for follow-up. States hanging in there. Had COVID and was sick for the last 4 days. Not feeling good. Physical health issues have been affecting her ability to play pickle ball. She has been having knee pain and has an appointment on Friday for it. Feels like not secure. States she is an introvert and hard to get out and does not want to do anything. Cannot motivate herself even to brush her teeth. Everything is very hard. Demetrice Quevedo is specifically hard for her and she has her superlight on. Stutters. Is try to find [address] [facility] but not covered by her Medicare in [place]. Has had shock therapy in the past.  Back shots worked really well last year and she did not have any issues with back pain last year. She has been taking care of her parents. She has 4 sisters and 3 of them have children. Another sister lives here but nobody tries to take care of her parents like she does. She wants to get back into AA. Has been sober from alcohol. Worries all the time. Supportive psychotherapy provided. We will up the dose of lamotrigine and trazodone as patient reports not being able to sleep good. She denies suicidal ideation/homicidal ideations. Denies symptoms of psychosis    Patient Active Problem List   Diagnosis    Sensory neuropathy, hereditary    Moderate episode of recurrent major depressive disorder (HCC)    Environmental allergies    Dystonia    Pure hypercholesterolemia       Denies palpitation,SOB, Chest pain, headaches. In no acute distress.        MEDICATION REVIEW:    Current Medications:    Current Outpatient Medications   Medication Sig    diclofenac (VOLTAREN) 75 MG EC tablet     FLUoxetine (PROZAC) 20 MG capsule Take 2 capsules by mouth daily    lamoTRIgine (LAMICTAL) 150 MG tablet Take 1 tablet by mouth daily    buPROPion (WELLBUTRIN XL) 300 MG extended release tablet Take 1 tablet by mouth every morning    traZODone (DESYREL) 150 MG tablet Take 2 tablets by mouth nightly    clonazePAM (KLONOPIN) 0.5 MG tablet Take 1 tablet by mouth daily as needed for Anxiety for up to 90 days. Max Daily Amount: 0.5 mg    Multiple Vitamin (MULTIVITAMIN ADULT PO) Take 1 tablet by mouth daily    gabapentin (NEURONTIN) 300 MG capsule Take 1 capsule by mouth 3 times daily for 180 days.    acetaminophen (TYLENOL) 500 MG tablet Take by mouth every 6 hours as needed    azelastine (ASTELIN) 0.1 % nasal spray 1 spray by Nasal route 2 times daily    vitamin D 25 MCG (1000 UT) CAPS Take 1,000 Units by mouth daily    levocetirizine (XYZAL) 5 MG tablet Take 5 mg by mouth daily    naproxen (NAPROSYN) 500 MG tablet Take 440 mg by mouth    nicotine polacrilex (NICORETTE) 2 MG gum Place 2 mg inside cheek    Ergocalciferol (DRISDOL) 200 MCG/ML drops Take 2,000 mg by mouth (Patient not taking: No sig reported)     No current facility-administered medications for this visit.       Allergies   Allergen Reactions    Dust Mite Extract Other (See Comments)       Past Medical History, Past Surgical History, Family history, Social History, and Medications were all reviewed with the patient today and updated as necessary.     Compliant with medication: Yes   Side effects from medications:  No     EXAMINATION  Musculoskeletal    GAIT AND STATION   [x] WNL   [] RESTRICTED   [] UNSTEADY WALK        [] ABNORMAL   [] UNBALANCED         PSYCHIATRIC     GENERAL APPEARANCE:   [x]  WELL GROOMED []     DISHEVELED   []  UNKEMPT      []  UNUSUAL/BIZZARE    [] WNL       ATTITUDE:   [x] COOPERATIVE   [] GUARDED   [] SUSPICIOUS      [] HOSTILE                            BEHAVIOR:   [x] CALM   [] HYPERACTIVE   [] MANNERISMS      [] BIZZARE         SPEECH:   [x] NORMAL FOR CLIENT, STUTTERS   [] SPONTANEOUS   [] SLURRED   [] WHISPERING      [] LOUD   [] PRESSURED   []  ARTICULATE       EYE CONTACT:   [x] WNL   [] BLANK STARE   [] INTENSE      [] AVOIDANT         MOOD:   [] EUTHYMIC   [x] ANXIOUS   [x] DEPRESSED      [] IRRITABLE   [] ANGRY   [] APATHETIC     AFFECT:   [x] CONGRUENT WITH MOOD   [] FLAT   [] CONSTRICTED      [] INAPPROPRIATE   [] LABILE           THOUGHT PROCESS:   [x] LOGICAL/GOAL-DIRECTED   [] FOI   [] CIRCUMSANTIAL      [] INCOHERENT   [] TANGENTIAL   [] CONCRETE      [] PERSEVERATION           THOUGHT CONTENT:                DELUSIONS  [x] DENIES  [] GRANDIOSE  [] PERSECUTORY  [] Worship  [] REFERENCE   HALLUCINATIONS  [x] DENIES  [] AUDITORY  [] VISUAL  [] OLFACTORY  [] TACTILE     [] GUSTATORY  [] SOMATIC         OBSESSIONS  [x] DENIES  [] PRESENT         SUICIDAL IDEATION  [x] DENIES  [] PRESENT W/O PLAN  [] PRESENT W/ PLAN       HOMICIDAL IDEATION  [x] DENIES  [] PRESENT W/O PLAN  [] PRESENT W/ PLAN           JUDGEMENT:   [x] GOOD   [] FAIR   [] POOR   INSIGHT:   [x] GOOD   [] FAIR   [] POOR     COGNITION:           SENSORIUM:   [x] ALERT   [] CLOUDED   [] DROWSY     ORIENTATION:   [x] INTACT   [] TIME:  PLACE  PERSON   RECENT & REMOTE MEMORY:   [] NORMAL   [x] OTHER:                  ATTENTION:   [] INTACT   [x] MILD IMPAIRMENT   [] SEVERE IMPAIRMENT     CONCENTRATION:   [] INTACT   [x] MILD IMPAIRMENT   [] SEVERE IMPAIRMENT     LANGUAGE:   [x] AVERAGE   [] ABOVE AVERAGE   [] BELOW AVERAGE     FUND OF KNOWLEDGE:   [] UNABLE TO ASSESS AT THIS TIME   [x] AVERAGE   [] ABOVE AVERAGE   [] BELOW AVERAGE      [] GOOD TO EXCELLENT KNOWLEDGE OF CURRENT EVENTS   [] POOR TO NO KNLEDGE OF CURRENT EVENTS           ABNORMAL MOVEMENTS:   [x] NONE   [] TICS   [] TREMORS   [] BIZZARE      [] FACE   [] TRUNK   [] EXTREMETIES   [] GESTURES        SLEEP:   [] GOOD   [] FAIR   [x] POOR      MUSCLE STRENGTH AND TONE   [] WNL   [] ATROPHY   [] SPASTIC        [] FLACCID   [] COGWHEEL         Diagnoses/Impressions:    ICD-10-CM    1.  Moderate episode of recurrent major depressive disorder ([facility] [address]  F33.1       2. Generalized anxiety disorder  F41.1 clonazePAM (KLONOPIN) 0.5 MG tablet      3. Insomnia due to mental disorder  F51.05       4. Chronic pain disorder  G89.4           TREATMENT GOALS:  Symptom reduction, Medication adherence, maintain therapeutic gains    LABS/IMAGING:    []  Ordered [x]  Reviewed []  New Labs Ordered:     LAB    Please refer to the lab tab in the epic and care everywhere for the most recent lab results. Plan:     [x]  Medication ordered: Wellbutrin, Klonopin, Prozac, Lamictal, trazodone to target depression, anxiety, insomnia. [x]  Medication education/counseling provided  Medication dosage and time to take, purpose/expected benefits/risks, common side effects, lab monitoring required and reason, expected length of treatment, risk of no treatment, effects on pregnancy/nursing, financial availability. Educated patient on  side effects/risks/benefits of meds including cardiac arrhythmias, suicidal ideations, orthostatic hypotension, serotonin syndrome, risk of chapo/hypomania from antidepressants, withdrawals from abrupt discontinuation of meds, risk of rash, risk of infection, risk of bleeding, risk of seizures, addiction potential, memory impairment with long term use of benzos, respiratory depression, high blood pressure, dizziness, drowsiness, sedation , risk of falls, Risk & benefits discussed: including but not limited to possible off-label medication usage. [x] Follow MSE for sxs improvement     I have reviewed the patients controlled substance prescription history, as maintained in the [place] prescription monitoring program, so that the prescription(s) for a  controlled substance can be given. Recommendations and Referrals: Follow up with : MD, requires monitoring of response to medication, requires monitoring of medication side effects.     Time until next PMA:     Follow up with Mental Health Clinicians: psychotherapy interventions, improve level of functioning, monitoring to prevent decompensation /hospitalization, monitoring to maintain therapeutic gains, symptoms (resolving and controlled)           Psychotherapy note:                                __10_ Minutes of psychotherapy     [x]  Supportive psychotherapy, Patient discussed certain situational and personal stressors ongoing in her life at this time, weight management d/w the patient. Sleep hygiene d/w patient. Patient allowed to vent out her emotions.  Scenarios were reviewed using role playing and CBT techniques in order to increase insight and decrease anxiety.       []  Disposition planning      []  Dangerous and will not contract for safety in the community    **Pateint has been notified: They are to call 911 or go to their nearest E.R. if they are experiencing a medical emergency or suicidal ideations/homicidal ideations.**  All ancillary documentation entered reviewed by provider.      PLEASE NOTE:  This document has been produced in part or whole using voice recognition software. Proofread however unrecognized errors in transcription may be present.        Cassie Kelley MD

## 2023-02-16 ENCOUNTER — OFFICE VISIT (OUTPATIENT)
Dept: NEUROLOGY | Age: 63
End: 2023-02-16

## 2023-02-16 VITALS
BODY MASS INDEX: 19.55 KG/M2 | SYSTOLIC BLOOD PRESSURE: 108 MMHG | HEART RATE: 66 BPM | HEIGHT: 69 IN | WEIGHT: 132 LBS | DIASTOLIC BLOOD PRESSURE: 84 MMHG | OXYGEN SATURATION: 99 %

## 2023-02-16 DIAGNOSIS — G24.3 CERVICAL DYSTONIA: Primary | ICD-10-CM

## 2023-02-16 NOTE — PROGRESS NOTES
Carlota Rojas  [address], [address] [facility], [address] [place] [address]  Phone: [phone] Fax [phone]  Dr. Paula Lal        Patient: Josh Anderson  Provider: Paula Lal MD     Procedure note: Botulinum Toxin injections     Indication: Cervical Dystonia        Subjective:      Mrs. Debby Marquez is a pleasant female with history of head tremor for several years. It is jerky and she feels a \"pulling\" sensation of the left ear towards the shoulder. No sensory tricks. Not much pain or stiffness. She has had several injections with us in the past.  Overall the injections have been met with benefit for her head jerking. She denies any adverse effects. She is overdue for injections and so has noted worsening breakthrough symptoms within the last few weeks. She has since followed up with pain management regarding her low back pain and they have been doing facet injections which have been helpful. Past medical history, surgical history, social history, family history, medications and allergies were all reviewed and updated as appropriate. Current Outpatient Medications on File Prior to Visit   Medication Sig Dispense Refill    diclofenac (VOLTAREN) 75 MG EC tablet       FLUoxetine (PROZAC) 20 MG capsule Take 2 capsules by mouth daily 180 capsule 1    lamoTRIgine (LAMICTAL) 150 MG tablet Take 1 tablet by mouth daily 90 tablet 1    buPROPion (WELLBUTRIN XL) 300 MG extended release tablet Take 1 tablet by mouth every morning 90 tablet 1    traZODone (DESYREL) 150 MG tablet Take 2 tablets by mouth nightly 180 tablet 1    clonazePAM (KLONOPIN) 0.5 MG tablet Take 1 tablet by mouth daily as needed for Anxiety for up to 90 days. Max Daily Amount: 0.5 mg 30 tablet 2    Multiple Vitamin (MULTIVITAMIN ADULT PO) Take 1 tablet by mouth daily      gabapentin (NEURONTIN) 300 MG capsule Take 1 capsule by mouth 3 times daily for 180 days.  90 capsule 5    acetaminophen (TYLENOL) 500 MG tablet Take by mouth every 6 hours as needed      azelastine (ASTELIN) 0.1 % nasal spray 1 spray by Nasal route 2 times daily      vitamin D 25 MCG (1000 UT) CAPS Take 1,000 Units by mouth daily      Ergocalciferol (DRISDOL) 200 MCG/ML drops Take 2,000 mg by mouth (Patient not taking: No sig reported)      levocetirizine (XYZAL) 5 MG tablet Take 5 mg by mouth daily      naproxen (NAPROSYN) 500 MG tablet Take 440 mg by mouth      nicotine polacrilex (NICORETTE) 2 MG gum Place 2 mg inside cheek       No current facility-administered medications on file prior to visit. Exam:      Vitals:    02/16/23 1445   BP: 108/84   Pulse: 66   SpO2: 99%     General Exam:  General - Well developed, well nourished, somewhat anxious appearing. HEENT - Normocephalic, atraumatic. Sclera anicteric. Oropharynx clear. Neck - Supple without masses  Cardiovascular - Regular rate and rhythm. No carotid bruits. Lungs - Non-labored breathing. Abdomen - Soft, nontender, nondistended. Extremities - +hammer toes and high arches bilaterally. Neurological Exam:      MS/Language/Speech - Alert. Oriented to person, place, and time. Frequent stammering noted. Speech otherwise clear. No vocal tremor. Cranial Nerves - PERRL. Eye movements full with normal pursuits. No nystagmus. Face was symmetric with good activation and normal sensation. Tongue and palate were midline. Shoulder shrug symmetric. Motor - There is a jerky head tremor noted throughout evaluation. Tendency for a slight right lateral shift with left turn and ?left head tilt at times. Extremity strength was full with exception bilateral hip flexors. There was no tremor of the hands. Tone was normal.      Sensory - Diminished to pinprick, vibration in the distal lower extremities. Cerebellar - No ataxia or dysmetria with FNF bilaterally. Reflexes (R/L): Biceps (3+/3+), Triceps (3+/3+), Brachioradialis (3+/3+), Patellar (3+/3+), Ankle (0+/0+).  Polanco's was negative and plantar responses were flexor. Gait - She can rise from a seated position without difficulty. She walks with a casual gait with normal arm swing. Assessment/Plan:      Mrs. Alane Collet is a 63 y. o.female who presents for chemodenervation for cervical dystonia. Proceed with botulinum toxin injections as noted below. Continue follow up with pain management      RTC 3 months for review and re-injection. Procedure:       Informed consent was obtained after the potential risks and benefits have been explained to the patient. Potential risks include:  Pain, bruising, bleeding, infection, flu-like symptoms, over-weakening of injected or adjacent muscles and swallowing dysfunction. Patient was given the botulinum toxin medication guide according to FDA standards. Procedure:  Using a 30 gauge 1.4 inch hollow lumen recording needle on a tuberculin syringe was used to inject bolutinum toxin in the muscles noted below, The following muscles were sampled utilizing EMG and injected as noted:       Injection:                                                        L splenius capitis 75 units  L splenius cervicis 50 units    R SCM   50 units       Total injected: 175 units BOTOX 100units/1 cc under EMG guidance. Waste: 25 units     Comments: There were no complications. The patient tolerated the procedure well. She will follow up in 3 months for review/re-injection. Signature: Carine Walls MD  Date: 2/16/23  [facility]   [address]  Ph: [phone]  Fax: [phone]       I have personally interviewed and examined Mr. Della Méndez and I have personally reviewed all relevant records including labs and imaging as noted above. I have written all aspects of this note. More than 50% of this time was used for counseling regarding my diagnosis, prognosis, and plans for management.      Total injection time: 12 minutes   Total visit time: N/A

## 2023-02-16 NOTE — LETTER
Informed Consent for Botulinumtoxina  (Botox)                                                                  GENIA#[id number]    Patient Name: Anastasiia James             :_1960    I give consent for the following procedure(s)  Botulinumtoxina (Botox) injections to be performed by:    [x]   Dr. Betty Parnell    []   Jean Pierre Harvey NP    []   Dr. Ata Goznalez    []   Dr. Nata Kim    I understand that my provider will have the main responsibility for my care specific to procedure. I understand the the doctor may be fellows, residents or other qualified first assistant's may be involved in my procedure under the supervision of the above provider. My provider has explained to me my condition, the nature and purpose of each procedure, and the risks and types of complications involved, the potential benefits and drawbacks, potential problems related to recovery, the likelihood of success, the possible results of non-treatment,and the reasonable options or alternatives. I have had the chance to ask questions about the planned procedure and all my questions have been answered to my satisfaction. I have received no promises from anyone about the results that may be obtained from the procedure. All procedures involve risk. Routine risks include, but are not limited to, perforation and/or injury to nearby blood vessels, nerve and/or organs, infection, blood clots. Other risks may include paralysis and/or _____________________________________. I understand that the information I have received about the risks may not be fully completed and other less common risks.     ________________________________________Date:_______________  Patient Signature    ________________________________________Date:_______________  Provider Signature                         _______________________________________ Date:_______________             Witness Signature

## 2023-03-20 ENCOUNTER — TRANSCRIBE ORDERS (OUTPATIENT)
Dept: SCHEDULING | Age: 63
End: 2023-03-20

## 2023-03-20 DIAGNOSIS — Z12.31 SCREENING MAMMOGRAM FOR HIGH-RISK PATIENT: Primary | ICD-10-CM

## 2023-03-24 ENCOUNTER — HOSPITAL ENCOUNTER (OUTPATIENT)
Dept: MAMMOGRAPHY | Age: 63
End: 2023-03-24
Payer: MEDICARE

## 2023-03-24 DIAGNOSIS — Z12.31 SCREENING MAMMOGRAM FOR HIGH-RISK PATIENT: ICD-10-CM

## 2023-03-24 PROCEDURE — 77063 BREAST TOMOSYNTHESIS BI: CPT

## 2023-05-25 ENCOUNTER — OFFICE VISIT (OUTPATIENT)
Dept: NEUROLOGY | Age: 63
End: 2023-05-25

## 2023-05-25 VITALS — HEART RATE: 86 BPM | DIASTOLIC BLOOD PRESSURE: 76 MMHG | SYSTOLIC BLOOD PRESSURE: 124 MMHG

## 2023-05-25 DIAGNOSIS — G24.3 CERVICAL DYSTONIA: Primary | ICD-10-CM

## 2023-06-27 ENCOUNTER — TELEMEDICINE (OUTPATIENT)
Dept: BEHAVIORAL/MENTAL HEALTH CLINIC | Age: 63
End: 2023-06-27
Payer: MEDICARE

## 2023-06-27 DIAGNOSIS — F51.05 INSOMNIA DUE TO MENTAL DISORDER: ICD-10-CM

## 2023-06-27 DIAGNOSIS — F41.1 GENERALIZED ANXIETY DISORDER: ICD-10-CM

## 2023-06-27 DIAGNOSIS — F33.42 RECURRENT MAJOR DEPRESSIVE DISORDER, IN FULL REMISSION (HCC): Primary | ICD-10-CM

## 2023-06-27 DIAGNOSIS — G89.4 CHRONIC PAIN DISORDER: ICD-10-CM

## 2023-06-27 PROCEDURE — G8427 DOCREV CUR MEDS BY ELIG CLIN: HCPCS | Performed by: PSYCHIATRY & NEUROLOGY

## 2023-06-27 PROCEDURE — 99214 OFFICE O/P EST MOD 30 MIN: CPT | Performed by: PSYCHIATRY & NEUROLOGY

## 2023-06-27 PROCEDURE — 3017F COLORECTAL CA SCREEN DOC REV: CPT | Performed by: PSYCHIATRY & NEUROLOGY

## 2023-06-27 RX ORDER — ROSUVASTATIN CALCIUM 20 MG/1
20 TABLET, COATED ORAL NIGHTLY
COMMUNITY
Start: 2023-05-09

## 2023-06-27 RX ORDER — FLUOXETINE HYDROCHLORIDE 20 MG/1
40 CAPSULE ORAL DAILY
Qty: 180 CAPSULE | Refills: 1 | Status: SHIPPED | OUTPATIENT
Start: 2023-06-27

## 2023-06-27 RX ORDER — CLONAZEPAM 0.5 MG/1
0.5 TABLET ORAL DAILY PRN
Qty: 30 TABLET | Refills: 3 | Status: SHIPPED | OUTPATIENT
Start: 2023-06-27 | End: 2023-10-25

## 2023-06-27 RX ORDER — BUPROPION HYDROCHLORIDE 300 MG/1
300 TABLET ORAL EVERY MORNING
Qty: 90 TABLET | Refills: 1 | Status: SHIPPED | OUTPATIENT
Start: 2023-06-27

## 2023-06-27 RX ORDER — LAMOTRIGINE 150 MG/1
150 TABLET ORAL DAILY
Qty: 90 TABLET | Refills: 1 | Status: SHIPPED | OUTPATIENT
Start: 2023-06-27

## 2023-06-27 RX ORDER — TRAZODONE HYDROCHLORIDE 150 MG/1
300 TABLET ORAL NIGHTLY
Qty: 180 TABLET | Refills: 1 | Status: SHIPPED | OUTPATIENT
Start: 2023-06-27

## 2023-06-27 ASSESSMENT — ANXIETY QUESTIONNAIRES
3. WORRYING TOO MUCH ABOUT DIFFERENT THINGS: 0
6. BECOMING EASILY ANNOYED OR IRRITABLE: 0
GAD7 TOTAL SCORE: 2
5. BEING SO RESTLESS THAT IT IS HARD TO SIT STILL: 0
2. NOT BEING ABLE TO STOP OR CONTROL WORRYING: 0
7. FEELING AFRAID AS IF SOMETHING AWFUL MIGHT HAPPEN: 1
IF YOU CHECKED OFF ANY PROBLEMS ON THIS QUESTIONNAIRE, HOW DIFFICULT HAVE THESE PROBLEMS MADE IT FOR YOU TO DO YOUR WORK, TAKE CARE OF THINGS AT HOME, OR GET ALONG WITH OTHER PEOPLE: SOMEWHAT DIFFICULT
1. FEELING NERVOUS, ANXIOUS, OR ON EDGE: 1
4. TROUBLE RELAXING: 0

## 2023-06-27 ASSESSMENT — PATIENT HEALTH QUESTIONNAIRE - PHQ9
8. MOVING OR SPEAKING SO SLOWLY THAT OTHER PEOPLE COULD HAVE NOTICED. OR THE OPPOSITE, BEING SO FIGETY OR RESTLESS THAT YOU HAVE BEEN MOVING AROUND A LOT MORE THAN USUAL: 0
1. LITTLE INTEREST OR PLEASURE IN DOING THINGS: 0
6. FEELING BAD ABOUT YOURSELF - OR THAT YOU ARE A FAILURE OR HAVE LET YOURSELF OR YOUR FAMILY DOWN: 0
SUM OF ALL RESPONSES TO PHQ QUESTIONS 1-9: 1
3. TROUBLE FALLING OR STAYING ASLEEP: 0
10. IF YOU CHECKED OFF ANY PROBLEMS, HOW DIFFICULT HAVE THESE PROBLEMS MADE IT FOR YOU TO DO YOUR WORK, TAKE CARE OF THINGS AT HOME, OR GET ALONG WITH OTHER PEOPLE: 0
9. THOUGHTS THAT YOU WOULD BE BETTER OFF DEAD, OR OF HURTING YOURSELF: 0
SUM OF ALL RESPONSES TO PHQ9 QUESTIONS 1 & 2: 0
SUM OF ALL RESPONSES TO PHQ QUESTIONS 1-9: 1
5. POOR APPETITE OR OVEREATING: 0
4. FEELING TIRED OR HAVING LITTLE ENERGY: 1
SUM OF ALL RESPONSES TO PHQ QUESTIONS 1-9: 1
SUM OF ALL RESPONSES TO PHQ QUESTIONS 1-9: 1
2. FEELING DOWN, DEPRESSED OR HOPELESS: 0
7. TROUBLE CONCENTRATING ON THINGS, SUCH AS READING THE NEWSPAPER OR WATCHING TELEVISION: 0

## 2023-09-14 ENCOUNTER — OFFICE VISIT (OUTPATIENT)
Dept: NEUROLOGY | Age: 63
End: 2023-09-14

## 2023-09-14 VITALS — HEART RATE: 86 BPM | DIASTOLIC BLOOD PRESSURE: 90 MMHG | SYSTOLIC BLOOD PRESSURE: 134 MMHG

## 2023-09-14 DIAGNOSIS — G24.3 CERVICAL DYSTONIA: Primary | ICD-10-CM

## 2023-09-14 NOTE — PROGRESS NOTES
[address], [address], [address]  Phone: [phone] Fax [phone]  Dr. Layne Montes De Oca        Patient: Sun Warren  Provider: Layne Montes De Oca MD     Procedure note: Botulinum Toxin injections     Indication: Cervical Dystonia        Subjective:      Mrs. Raulito Dutta is a pleasant female with history of head tremor for several years. It is jerky and she feels a \"pulling\" sensation of the left ear towards the shoulder. No sensory tricks. Not much pain or stiffness. She has had several injections with us over the past couple of years. Overall the injections have been met with benefit for her head jerking. She denies any adverse effects. She can note some slight wearing off within the last couple of weeks before her next injection. She has since followed up with pain management regarding her low back pain and they have been doing facet injections which have bided some temporary relief. Past medical history, surgical history, social history, family history, medications and allergies were all reviewed and updated as appropriate. Current Outpatient Medications on File Prior to Visit   Medication Sig Dispense Refill    rosuvastatin (CRESTOR) 20 MG tablet Take 1 tablet by mouth nightly      FLUoxetine (PROZAC) 20 MG capsule Take 2 capsules by mouth daily 180 capsule 1    lamoTRIgine (LAMICTAL) 150 MG tablet Take 1 tablet by mouth daily 90 tablet 1    buPROPion (WELLBUTRIN XL) 300 MG extended release tablet Take 1 tablet by mouth every morning 90 tablet 1    traZODone (DESYREL) 150 MG tablet Take 2 tablets by mouth nightly 180 tablet 1    clonazePAM (KLONOPIN) 0.5 MG tablet Take 1 tablet by mouth daily as needed for Anxiety for up to 120 days.  Max Daily Amount: 0.5 mg 30 tablet 3    diclofenac (VOLTAREN) 75 MG EC tablet       Multiple Vitamin (MULTIVITAMIN ADULT PO) Take 1 tablet by mouth daily      gabapentin (NEURONTIN) 300 MG capsule Take 1 capsule by mouth 3

## 2023-10-13 ENCOUNTER — TELEMEDICINE (OUTPATIENT)
Dept: BEHAVIORAL/MENTAL HEALTH CLINIC | Age: 63
End: 2023-10-13
Payer: MEDICARE

## 2023-10-13 DIAGNOSIS — F41.1 GENERALIZED ANXIETY DISORDER: ICD-10-CM

## 2023-10-13 DIAGNOSIS — F51.05 INSOMNIA DUE TO MENTAL DISORDER: ICD-10-CM

## 2023-10-13 DIAGNOSIS — F33.0 MILD EPISODE OF RECURRENT MAJOR DEPRESSIVE DISORDER (HCC): Primary | ICD-10-CM

## 2023-10-13 DIAGNOSIS — G89.4 CHRONIC PAIN DISORDER: ICD-10-CM

## 2023-10-13 PROCEDURE — 99214 OFFICE O/P EST MOD 30 MIN: CPT | Performed by: PSYCHIATRY & NEUROLOGY

## 2023-10-13 PROCEDURE — G8427 DOCREV CUR MEDS BY ELIG CLIN: HCPCS | Performed by: PSYCHIATRY & NEUROLOGY

## 2023-10-13 PROCEDURE — 3017F COLORECTAL CA SCREEN DOC REV: CPT | Performed by: PSYCHIATRY & NEUROLOGY

## 2023-10-13 RX ORDER — BUPROPION HYDROCHLORIDE 300 MG/1
300 TABLET ORAL EVERY MORNING
Qty: 90 TABLET | Refills: 1 | Status: SHIPPED | OUTPATIENT
Start: 2023-10-13

## 2023-10-13 RX ORDER — TRAZODONE HYDROCHLORIDE 150 MG/1
300 TABLET ORAL NIGHTLY
Qty: 180 TABLET | Refills: 1 | Status: SHIPPED | OUTPATIENT
Start: 2023-10-13

## 2023-10-13 RX ORDER — LAMOTRIGINE 150 MG/1
150 TABLET ORAL DAILY
Qty: 90 TABLET | Refills: 1 | Status: SHIPPED | OUTPATIENT
Start: 2023-10-13

## 2023-10-13 RX ORDER — FLUOXETINE HYDROCHLORIDE 20 MG/1
40 CAPSULE ORAL DAILY
Qty: 180 CAPSULE | Refills: 1 | Status: SHIPPED | OUTPATIENT
Start: 2023-10-13

## 2023-10-13 ASSESSMENT — PATIENT HEALTH QUESTIONNAIRE - PHQ9
7. TROUBLE CONCENTRATING ON THINGS, SUCH AS READING THE NEWSPAPER OR WATCHING TELEVISION: 1
SUM OF ALL RESPONSES TO PHQ QUESTIONS 1-9: 10
10. IF YOU CHECKED OFF ANY PROBLEMS, HOW DIFFICULT HAVE THESE PROBLEMS MADE IT FOR YOU TO DO YOUR WORK, TAKE CARE OF THINGS AT HOME, OR GET ALONG WITH OTHER PEOPLE: 1
2. FEELING DOWN, DEPRESSED OR HOPELESS: 1
SUM OF ALL RESPONSES TO PHQ QUESTIONS 1-9: 10
9. THOUGHTS THAT YOU WOULD BE BETTER OFF DEAD, OR OF HURTING YOURSELF: 0
4. FEELING TIRED OR HAVING LITTLE ENERGY: 3
SUM OF ALL RESPONSES TO PHQ QUESTIONS 1-9: 10
SUM OF ALL RESPONSES TO PHQ9 QUESTIONS 1 & 2: 2
1. LITTLE INTEREST OR PLEASURE IN DOING THINGS: 1
3. TROUBLE FALLING OR STAYING ASLEEP: 1
8. MOVING OR SPEAKING SO SLOWLY THAT OTHER PEOPLE COULD HAVE NOTICED. OR THE OPPOSITE, BEING SO FIGETY OR RESTLESS THAT YOU HAVE BEEN MOVING AROUND A LOT MORE THAN USUAL: 1
SUM OF ALL RESPONSES TO PHQ QUESTIONS 1-9: 10
6. FEELING BAD ABOUT YOURSELF - OR THAT YOU ARE A FAILURE OR HAVE LET YOURSELF OR YOUR FAMILY DOWN: 1
5. POOR APPETITE OR OVEREATING: 1

## 2023-10-13 ASSESSMENT — ANXIETY QUESTIONNAIRES
IF YOU CHECKED OFF ANY PROBLEMS ON THIS QUESTIONNAIRE, HOW DIFFICULT HAVE THESE PROBLEMS MADE IT FOR YOU TO DO YOUR WORK, TAKE CARE OF THINGS AT HOME, OR GET ALONG WITH OTHER PEOPLE: SOMEWHAT DIFFICULT
5. BEING SO RESTLESS THAT IT IS HARD TO SIT STILL: 0
4. TROUBLE RELAXING: 0
7. FEELING AFRAID AS IF SOMETHING AWFUL MIGHT HAPPEN: 1
3. WORRYING TOO MUCH ABOUT DIFFERENT THINGS: 0
1. FEELING NERVOUS, ANXIOUS, OR ON EDGE: 1
GAD7 TOTAL SCORE: 2
6. BECOMING EASILY ANNOYED OR IRRITABLE: 0
2. NOT BEING ABLE TO STOP OR CONTROL WORRYING: 0

## 2023-10-13 NOTE — PROGRESS NOTES
Patient:  Manuelito Pritchett  Age:  66 y.o.  :  1960     SEX:  female MRN:  [id number]     RACE: White (non-)     SEEN:  [x]  PATIENT  []  SPOUSE []  OTHER:                  10/13/2023     8:59 AM 2023    11:09 AM 2023    11:12 AM   PHQ-9    Little interest or pleasure in doing things 1 0 2   Feeling down, depressed, or hopeless 1 0 2   Trouble falling or staying asleep, or sleeping too much 1 0 2   Feeling tired or having little energy 3 1 3   Poor appetite or overeating 1 0 0   Feeling bad about yourself - or that you are a failure or have let yourself or your family down 1 0 2   Trouble concentrating on things, such as reading the newspaper or watching television 1 0 2   Moving or speaking so slowly that other people could have noticed. Or the opposite - being so fidgety or restless that you have been moving around a lot more than usual 1 0 2   Thoughts that you would be better off dead, or of hurting yourself in some way 0 0 0   PHQ-2 Score 2 0 4   PHQ-9 Total Score 10 1 15   If you checked off any problems, how difficult have these problems made it for you to do your work, take care of things at home, or get along with other people?  1 0 2           10/13/2023     9:01 AM 2023    11:10 AM 2023    11:16 AM   GAURAV-7 SCREENING   Feeling nervous, anxious, or on edge Several days Several days More than half the days   Not being able to stop or control worrying Not at all Not at all Several days   Worrying too much about different things Not at all Not at all Several days   Trouble relaxing Not at all Not at all Not at all   Being so restless that it is hard to sit still Not at all Not at all Not at all   Becoming easily annoyed or irritable Not at all Not at all Not at all   Feeling afraid as if something awful might happen Several days Several days Several days   GAURAV-7 Total Score 2 2 5   How difficult have these problems made it for you to do your work, take care of things

## 2023-11-01 ENCOUNTER — PATIENT MESSAGE (OUTPATIENT)
Dept: NEUROLOGY | Age: 63
End: 2023-11-01

## 2023-11-01 DIAGNOSIS — G24.3 CERVICAL DYSTONIA: Primary | ICD-10-CM

## 2023-11-01 DIAGNOSIS — M62.838 MUSCLE SPASM: ICD-10-CM

## 2023-11-03 RX ORDER — BACLOFEN 5 MG/1
5 TABLET ORAL 3 TIMES DAILY PRN
Qty: 90 TABLET | Refills: 2 | Status: SHIPPED | OUTPATIENT
Start: 2023-11-03

## 2023-11-20 DIAGNOSIS — F41.1 GENERALIZED ANXIETY DISORDER: ICD-10-CM

## 2023-11-21 ENCOUNTER — TELEPHONE (OUTPATIENT)
Dept: PRIMARY CARE CLINIC | Facility: CLINIC | Age: 63
End: 2023-11-21

## 2023-11-21 ENCOUNTER — TELEPHONE (OUTPATIENT)
Dept: BEHAVIORAL/MENTAL HEALTH CLINIC | Age: 63
End: 2023-11-21

## 2023-11-21 RX ORDER — CLONAZEPAM 0.5 MG/1
TABLET ORAL
Qty: 30 TABLET | Refills: 0 | OUTPATIENT
Start: 2023-11-21

## 2023-11-21 NOTE — TELEPHONE ENCOUNTER
Patient stated that at her last visit she advised she did not need a Klonopin refill but when she went to the pharmacy to refill it they advised that the prescription that was on file is  and cannot be filled. Would like a new prescription sent in. Stated that she needs it with the holidays coming up.

## 2023-11-23 DIAGNOSIS — F41.1 GENERALIZED ANXIETY DISORDER: ICD-10-CM

## 2023-11-23 RX ORDER — CLONAZEPAM 0.5 MG/1
0.5 TABLET ORAL DAILY PRN
Qty: 30 TABLET | Refills: 1 | Status: SHIPPED | OUTPATIENT
Start: 2023-11-23 | End: 2024-03-22

## 2023-12-28 ENCOUNTER — TELEMEDICINE (OUTPATIENT)
Dept: BEHAVIORAL/MENTAL HEALTH CLINIC | Age: 63
End: 2023-12-28
Payer: MEDICARE

## 2023-12-28 DIAGNOSIS — F33.0 MILD EPISODE OF RECURRENT MAJOR DEPRESSIVE DISORDER (HCC): Primary | ICD-10-CM

## 2023-12-28 DIAGNOSIS — F41.1 GENERALIZED ANXIETY DISORDER: ICD-10-CM

## 2023-12-28 DIAGNOSIS — G89.4 CHRONIC PAIN DISORDER: ICD-10-CM

## 2023-12-28 DIAGNOSIS — F51.05 INSOMNIA DUE TO MENTAL DISORDER: ICD-10-CM

## 2023-12-28 PROCEDURE — 99214 OFFICE O/P EST MOD 30 MIN: CPT | Performed by: PSYCHIATRY & NEUROLOGY

## 2023-12-28 PROCEDURE — G8427 DOCREV CUR MEDS BY ELIG CLIN: HCPCS | Performed by: PSYCHIATRY & NEUROLOGY

## 2023-12-28 PROCEDURE — 3017F COLORECTAL CA SCREEN DOC REV: CPT | Performed by: PSYCHIATRY & NEUROLOGY

## 2023-12-28 RX ORDER — LAMOTRIGINE 150 MG/1
150 TABLET ORAL DAILY
Qty: 90 TABLET | Refills: 1 | Status: SHIPPED | OUTPATIENT
Start: 2023-12-28

## 2023-12-28 RX ORDER — FLUOXETINE HYDROCHLORIDE 20 MG/1
40 CAPSULE ORAL DAILY
Qty: 180 CAPSULE | Refills: 1 | Status: SHIPPED | OUTPATIENT
Start: 2023-12-28

## 2023-12-28 RX ORDER — CLONAZEPAM 0.5 MG/1
0.5 TABLET ORAL DAILY PRN
Qty: 30 TABLET | Refills: 1 | Status: SHIPPED | OUTPATIENT
Start: 2023-12-28 | End: 2024-04-26

## 2023-12-28 RX ORDER — GABAPENTIN 300 MG/1
300 CAPSULE ORAL 3 TIMES DAILY
Qty: 90 CAPSULE | Refills: 5 | Status: SHIPPED | OUTPATIENT
Start: 2023-12-28 | End: 2024-06-25

## 2023-12-28 RX ORDER — TRAZODONE HYDROCHLORIDE 150 MG/1
300 TABLET ORAL NIGHTLY
Qty: 180 TABLET | Refills: 1 | Status: SHIPPED | OUTPATIENT
Start: 2023-12-28

## 2023-12-28 RX ORDER — BUPROPION HYDROCHLORIDE 300 MG/1
300 TABLET ORAL EVERY MORNING
Qty: 90 TABLET | Refills: 1 | Status: SHIPPED | OUTPATIENT
Start: 2023-12-28

## 2023-12-28 ASSESSMENT — ANXIETY QUESTIONNAIRES
5. BEING SO RESTLESS THAT IT IS HARD TO SIT STILL: 0
IF YOU CHECKED OFF ANY PROBLEMS ON THIS QUESTIONNAIRE, HOW DIFFICULT HAVE THESE PROBLEMS MADE IT FOR YOU TO DO YOUR WORK, TAKE CARE OF THINGS AT HOME, OR GET ALONG WITH OTHER PEOPLE: VERY DIFFICULT
6. BECOMING EASILY ANNOYED OR IRRITABLE: 0
3. WORRYING TOO MUCH ABOUT DIFFERENT THINGS: 1
7. FEELING AFRAID AS IF SOMETHING AWFUL MIGHT HAPPEN: 1
1. FEELING NERVOUS, ANXIOUS, OR ON EDGE: 3
GAD7 TOTAL SCORE: 6
2. NOT BEING ABLE TO STOP OR CONTROL WORRYING: 1
4. TROUBLE RELAXING: 0

## 2023-12-28 ASSESSMENT — PATIENT HEALTH QUESTIONNAIRE - PHQ9
5. POOR APPETITE OR OVEREATING: 0
SUM OF ALL RESPONSES TO PHQ QUESTIONS 1-9: 7
8. MOVING OR SPEAKING SO SLOWLY THAT OTHER PEOPLE COULD HAVE NOTICED. OR THE OPPOSITE, BEING SO FIGETY OR RESTLESS THAT YOU HAVE BEEN MOVING AROUND A LOT MORE THAN USUAL: 0
7. TROUBLE CONCENTRATING ON THINGS, SUCH AS READING THE NEWSPAPER OR WATCHING TELEVISION: 1
3. TROUBLE FALLING OR STAYING ASLEEP: 0
6. FEELING BAD ABOUT YOURSELF - OR THAT YOU ARE A FAILURE OR HAVE LET YOURSELF OR YOUR FAMILY DOWN: 1
2. FEELING DOWN, DEPRESSED OR HOPELESS: 1
SUM OF ALL RESPONSES TO PHQ9 QUESTIONS 1 & 2: 2
4. FEELING TIRED OR HAVING LITTLE ENERGY: 3
SUM OF ALL RESPONSES TO PHQ QUESTIONS 1-9: 7
10. IF YOU CHECKED OFF ANY PROBLEMS, HOW DIFFICULT HAVE THESE PROBLEMS MADE IT FOR YOU TO DO YOUR WORK, TAKE CARE OF THINGS AT HOME, OR GET ALONG WITH OTHER PEOPLE: 1
SUM OF ALL RESPONSES TO PHQ QUESTIONS 1-9: 7
SUM OF ALL RESPONSES TO PHQ QUESTIONS 1-9: 7
9. THOUGHTS THAT YOU WOULD BE BETTER OFF DEAD, OR OF HURTING YOURSELF: 0
1. LITTLE INTEREST OR PLEASURE IN DOING THINGS: 1
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
## 2024-03-06 ENCOUNTER — TELEMEDICINE (OUTPATIENT)
Dept: BEHAVIORAL/MENTAL HEALTH CLINIC | Age: 64
End: 2024-03-06
Payer: MEDICARE

## 2024-03-06 DIAGNOSIS — Z65.8 PSYCHOSOCIAL STRESSORS: ICD-10-CM

## 2024-03-06 DIAGNOSIS — G89.4 CHRONIC PAIN DISORDER: ICD-10-CM

## 2024-03-06 DIAGNOSIS — F51.05 INSOMNIA DUE TO MENTAL DISORDER: ICD-10-CM

## 2024-03-06 DIAGNOSIS — F41.1 GENERALIZED ANXIETY DISORDER: ICD-10-CM

## 2024-03-06 DIAGNOSIS — F33.1 MODERATE EPISODE OF RECURRENT MAJOR DEPRESSIVE DISORDER (HCC): Primary | ICD-10-CM

## 2024-03-06 PROCEDURE — G8427 DOCREV CUR MEDS BY ELIG CLIN: HCPCS | Performed by: PSYCHIATRY & NEUROLOGY

## 2024-03-06 PROCEDURE — 99214 OFFICE O/P EST MOD 30 MIN: CPT | Performed by: PSYCHIATRY & NEUROLOGY

## 2024-03-06 PROCEDURE — 3017F COLORECTAL CA SCREEN DOC REV: CPT | Performed by: PSYCHIATRY & NEUROLOGY

## 2024-03-06 RX ORDER — BUPROPION HYDROCHLORIDE 300 MG/1
300 TABLET ORAL EVERY MORNING
Qty: 90 TABLET | Refills: 1 | Status: SHIPPED | OUTPATIENT
Start: 2024-03-06

## 2024-03-06 RX ORDER — CLONAZEPAM 0.5 MG/1
0.5 TABLET ORAL DAILY PRN
Qty: 30 TABLET | Refills: 1 | Status: SHIPPED | OUTPATIENT
Start: 2024-03-06 | End: 2024-07-04

## 2024-03-06 RX ORDER — LAMOTRIGINE 150 MG/1
150 TABLET ORAL DAILY
Qty: 90 TABLET | Refills: 1 | Status: SHIPPED | OUTPATIENT
Start: 2024-03-06

## 2024-03-06 RX ORDER — GABAPENTIN 300 MG/1
300 CAPSULE ORAL 3 TIMES DAILY
Qty: 90 CAPSULE | Refills: 5 | Status: SHIPPED | OUTPATIENT
Start: 2024-03-06 | End: 2024-09-02

## 2024-03-06 RX ORDER — TRAZODONE HYDROCHLORIDE 150 MG/1
300 TABLET ORAL NIGHTLY
Qty: 180 TABLET | Refills: 1 | Status: SHIPPED | OUTPATIENT
Start: 2024-03-06

## 2024-03-06 RX ORDER — FLUOXETINE HYDROCHLORIDE 20 MG/1
40 CAPSULE ORAL DAILY
Qty: 180 CAPSULE | Refills: 1 | Status: SHIPPED | OUTPATIENT
Start: 2024-03-06

## 2024-03-06 ASSESSMENT — ANXIETY QUESTIONNAIRES
GAD7 TOTAL SCORE: 6
4. TROUBLE RELAXING: 2
3. WORRYING TOO MUCH ABOUT DIFFERENT THINGS: 2
6. BECOMING EASILY ANNOYED OR IRRITABLE: 0
7. FEELING AFRAID AS IF SOMETHING AWFUL MIGHT HAPPEN: 2
5. BEING SO RESTLESS THAT IT IS HARD TO SIT STILL: 0
IF YOU CHECKED OFF ANY PROBLEMS ON THIS QUESTIONNAIRE, HOW DIFFICULT HAVE THESE PROBLEMS MADE IT FOR YOU TO DO YOUR WORK, TAKE CARE OF THINGS AT HOME, OR GET ALONG WITH OTHER PEOPLE: VERY DIFFICULT
2. NOT BEING ABLE TO STOP OR CONTROL WORRYING: 0
1. FEELING NERVOUS, ANXIOUS, OR ON EDGE: 0

## 2024-03-06 ASSESSMENT — PATIENT HEALTH QUESTIONNAIRE - PHQ9
5. POOR APPETITE OR OVEREATING: 0
9. THOUGHTS THAT YOU WOULD BE BETTER OFF DEAD, OR OF HURTING YOURSELF: 0
SUM OF ALL RESPONSES TO PHQ QUESTIONS 1-9: 18
SUM OF ALL RESPONSES TO PHQ QUESTIONS 1-9: 18
8. MOVING OR SPEAKING SO SLOWLY THAT OTHER PEOPLE COULD HAVE NOTICED. OR THE OPPOSITE, BEING SO FIGETY OR RESTLESS THAT YOU HAVE BEEN MOVING AROUND A LOT MORE THAN USUAL: 1
4. FEELING TIRED OR HAVING LITTLE ENERGY: 3
1. LITTLE INTEREST OR PLEASURE IN DOING THINGS: 3
SUM OF ALL RESPONSES TO PHQ QUESTIONS 1-9: 18
SUM OF ALL RESPONSES TO PHQ9 QUESTIONS 1 & 2: 6
10. IF YOU CHECKED OFF ANY PROBLEMS, HOW DIFFICULT HAVE THESE PROBLEMS MADE IT FOR YOU TO DO YOUR WORK, TAKE CARE OF THINGS AT HOME, OR GET ALONG WITH OTHER PEOPLE: 2
2. FEELING DOWN, DEPRESSED OR HOPELESS: 3
SUM OF ALL RESPONSES TO PHQ QUESTIONS 1-9: 18
7. TROUBLE CONCENTRATING ON THINGS, SUCH AS READING THE NEWSPAPER OR WATCHING TELEVISION: 2
6. FEELING BAD ABOUT YOURSELF - OR THAT YOU ARE A FAILURE OR HAVE LET YOURSELF OR YOUR FAMILY DOWN: 3
3. TROUBLE FALLING OR STAYING ASLEEP: 3

## 2024-03-06 NOTE — PROGRESS NOTES
is never felt good enough for them.  They are very critical of her.  Supportive psychotherapy provided.  Patient agrees to start journaling from today at least 2 things she has done for her parents.  States she tries to do her best for her parents.  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  Patient denies suicidal ideation/homicidal ideations.  Denies symptoms of psychosis.      Patient Active Problem List   Diagnosis    Sensory neuropathy, hereditary    Moderate episode of recurrent major depressive disorder (HCC)    Environmental allergies    Dystonia    Pure hypercholesterolemia       Denies palpitation,SOB, Chest pain, headaches. In no acute distress.     There were no vitals taken for this visit.      MEDICATION REVIEW:    Current Medications:    Current Outpatient Medications   Medication Sig    traZODone (DESYREL) 150 MG tablet Take 2 tablets by mouth nightly    lamoTRIgine (LAMICTAL) 150 MG tablet Take 1 tablet by mouth daily    gabapentin (NEURONTIN) 300 MG capsule Take 1 capsule by mouth 3 times daily for 180 days.    FLUoxetine (PROZAC) 20 MG capsule Take 2 capsules by mouth daily    clonazePAM (KLONOPIN) 0.5 MG tablet Take 1 tablet by mouth daily as needed for Anxiety for up to 120 days. Max Daily Amount: 0.5 mg    buPROPion (WELLBUTRIN XL) 300 MG extended release tablet Take 1 tablet by mouth every morning    rosuvastatin (CRESTOR) 20 MG tablet Take 1 tablet by mouth nightly    diclofenac (VOLTAREN) 75 MG EC tablet     Multiple Vitamin (MULTIVITAMIN ADULT PO) Take 1 tablet by mouth daily    acetaminophen (TYLENOL) 500 MG tablet Take by mouth every 6 hours as needed    Ergocalciferol (DRISDOL) 200 MCG/ML drops Take 10,000 mLs by mouth    levocetirizine (XYZAL) 5 MG tablet Take 1 tablet by mouth daily    nicotine polacrilex (NICORETTE) 2 MG gum Place 1 each inside cheek    Baclofen (LIORESAL) 5 MG tablet Take 1 tablet by mouth 3 times daily as needed (muscle spasm) (Patient not taking: Reported on

## 2024-04-04 ENCOUNTER — OFFICE VISIT (OUTPATIENT)
Dept: NEUROLOGY | Age: 64
End: 2024-04-04

## 2024-04-04 VITALS
WEIGHT: 132 LBS | DIASTOLIC BLOOD PRESSURE: 92 MMHG | SYSTOLIC BLOOD PRESSURE: 124 MMHG | BODY MASS INDEX: 19.49 KG/M2 | HEART RATE: 85 BPM

## 2024-04-04 DIAGNOSIS — G24.3 CERVICAL DYSTONIA: Primary | ICD-10-CM

## 2024-04-04 DIAGNOSIS — M62.838 MUSCLE SPASM: ICD-10-CM

## 2024-04-04 DIAGNOSIS — R41.3 MEMORY LOSS: ICD-10-CM

## 2024-04-04 NOTE — PROGRESS NOTES
[facility] NEUROLOGY  [address], [address]  [place] [address]  Phone: [phone] Fax [phone]  Dr. Praveen Sotomayor        Patient: Ying Mckeon  Provider: Praveen Sotomayor MD     Procedure note:  Botulinum Toxin injections     Indication: Cervical Dystonia        Subjective:      Mrs. Mckeon is a pleasant female with history of head tremor for several years.  She has a jerking sensation in her neck and a \"pulling\" sensation of the head to the left.  She is not aware of any sensory tricks.  She does note some muscle spasm and stiffness.    She has had several injections with us over the past couple of years. Overall the injections have been met with benefit for her head jerking.  She denies any adverse effects.  Has tried muscle relaxers in the past including baclofen but no longer taking it.    She has endorsed some recent increase stressors.  She has a pattern of speech impairment consistent with stuttering/stammering which is always felt to be related to increasing anxiety.  She also notices some worsening short-term memory lapses.  [place] her [place] for the duration of the summer and will likely not return until September.  So we discussed other backup options for alleviation of cervical spasms since she will likely be late on her next injection.       Past medical history, surgical history, social history, family history, medications and allergies were all reviewed and updated as appropriate.      Current Outpatient Medications on File Prior to Visit   Medication Sig Dispense Refill    traZODone (DESYREL) 150 MG tablet Take 2 tablets by mouth nightly 180 tablet 1    lamoTRIgine (LAMICTAL) 150 MG tablet Take 1 tablet by mouth daily 90 tablet 1    gabapentin (NEURONTIN) 300 MG capsule Take 1 capsule by mouth 3 times daily for 180 days. 90 capsule 5    FLUoxetine (PROZAC) 20 MG capsule Take 2 capsules by mouth daily 180 capsule 1    clonazePAM (KLONOPIN) 0.5 
show

## 2024-04-24 ENCOUNTER — TELEPHONE (OUTPATIENT)
Dept: INTERNAL MEDICINE CLINIC | Facility: CLINIC | Age: 64
End: 2024-04-24

## 2024-04-24 NOTE — TELEPHONE ENCOUNTER
----- Message from Chanda Rod sent at 4/22/2024 10:25 AM EDT -----  Subject: Referral Request    Reason for referral request? lab orders for blood work   Provider patient wants to be referred to(if known):     Provider Phone Number(if known):    Additional Information for Provider? pt would like the orders to be sent   to [facility] before her np appt on 4/30  ---------------------------------------------------------------------------  --------------  CALL BACK INFO    [phone]; OK to leave message on voicemail  ---------------------------------------------------------------------------  --------------

## 2024-04-24 NOTE — TELEPHONE ENCOUNTER
I spoke with this patient. I let her know that Dr. Garland orders his labs during the appointment and blood is drawn here, after the visit.

## 2024-04-30 ENCOUNTER — OFFICE VISIT (OUTPATIENT)
Dept: INTERNAL MEDICINE CLINIC | Facility: CLINIC | Age: 64
End: 2024-04-30
Payer: MEDICARE

## 2024-04-30 VITALS
OXYGEN SATURATION: 99 % | TEMPERATURE: 97.1 F | DIASTOLIC BLOOD PRESSURE: 58 MMHG | BODY MASS INDEX: 17.92 KG/M2 | HEIGHT: 69 IN | HEART RATE: 70 BPM | WEIGHT: 121 LBS | SYSTOLIC BLOOD PRESSURE: 114 MMHG

## 2024-04-30 DIAGNOSIS — Z91.09 ENVIRONMENTAL ALLERGIES: ICD-10-CM

## 2024-04-30 DIAGNOSIS — R07.81 CHEST PAIN, PLEURITIC: ICD-10-CM

## 2024-04-30 DIAGNOSIS — E78.49 OTHER HYPERLIPIDEMIA: Primary | ICD-10-CM

## 2024-04-30 PROCEDURE — G8419 CALC BMI OUT NRM PARAM NOF/U: HCPCS | Performed by: INTERNAL MEDICINE

## 2024-04-30 PROCEDURE — 3017F COLORECTAL CA SCREEN DOC REV: CPT | Performed by: INTERNAL MEDICINE

## 2024-04-30 PROCEDURE — 99204 OFFICE O/P NEW MOD 45 MIN: CPT | Performed by: INTERNAL MEDICINE

## 2024-04-30 PROCEDURE — 1036F TOBACCO NON-USER: CPT | Performed by: INTERNAL MEDICINE

## 2024-04-30 PROCEDURE — G8427 DOCREV CUR MEDS BY ELIG CLIN: HCPCS | Performed by: INTERNAL MEDICINE

## 2024-04-30 PROCEDURE — G2211 COMPLEX E/M VISIT ADD ON: HCPCS | Performed by: INTERNAL MEDICINE

## 2024-04-30 RX ORDER — LEVOCETIRIZINE DIHYDROCHLORIDE 5 MG/1
5 TABLET, FILM COATED ORAL DAILY
Qty: 90 TABLET | Refills: 1 | Status: SHIPPED | OUTPATIENT
Start: 2024-04-30

## 2024-04-30 RX ORDER — ROSUVASTATIN CALCIUM 20 MG/1
20 TABLET, COATED ORAL NIGHTLY
Qty: 90 TABLET | Refills: 1 | Status: SHIPPED | OUTPATIENT
Start: 2024-04-30

## 2024-04-30 SDOH — ECONOMIC STABILITY: FOOD INSECURITY: WITHIN THE PAST 12 MONTHS, THE FOOD YOU BOUGHT JUST DIDN'T LAST AND YOU DIDN'T HAVE MONEY TO GET MORE.: NEVER TRUE

## 2024-04-30 SDOH — ECONOMIC STABILITY: FOOD INSECURITY: WITHIN THE PAST 12 MONTHS, YOU WORRIED THAT YOUR FOOD WOULD RUN OUT BEFORE YOU GOT MONEY TO BUY MORE.: NEVER TRUE

## 2024-04-30 SDOH — ECONOMIC STABILITY: HOUSING INSECURITY
IN THE LAST 12 MONTHS, WAS THERE A TIME WHEN YOU DID NOT HAVE A STEADY PLACE TO SLEEP OR SLEPT IN A SHELTER (INCLUDING NOW)?: NO

## 2024-04-30 SDOH — ECONOMIC STABILITY: INCOME INSECURITY: HOW HARD IS IT FOR YOU TO PAY FOR THE VERY BASICS LIKE FOOD, HOUSING, MEDICAL CARE, AND HEATING?: NOT HARD AT ALL

## 2024-04-30 ASSESSMENT — PATIENT HEALTH QUESTIONNAIRE - PHQ9
2. FEELING DOWN, DEPRESSED OR HOPELESS: NOT AT ALL
SUM OF ALL RESPONSES TO PHQ9 QUESTIONS 1 & 2: 0
SUM OF ALL RESPONSES TO PHQ QUESTIONS 1-9: 0
10. IF YOU CHECKED OFF ANY PROBLEMS, HOW DIFFICULT HAVE THESE PROBLEMS MADE IT FOR YOU TO DO YOUR WORK, TAKE CARE OF THINGS AT HOME, OR GET ALONG WITH OTHER PEOPLE: NOT DIFFICULT AT ALL
8. MOVING OR SPEAKING SO SLOWLY THAT OTHER PEOPLE COULD HAVE NOTICED. OR THE OPPOSITE, BEING SO FIGETY OR RESTLESS THAT YOU HAVE BEEN MOVING AROUND A LOT MORE THAN USUAL: NOT AT ALL
6. FEELING BAD ABOUT YOURSELF - OR THAT YOU ARE A FAILURE OR HAVE LET YOURSELF OR YOUR FAMILY DOWN: NOT AT ALL
SUM OF ALL RESPONSES TO PHQ QUESTIONS 1-9: 0
SUM OF ALL RESPONSES TO PHQ QUESTIONS 1-9: 0
3. TROUBLE FALLING OR STAYING ASLEEP: NOT AT ALL
9. THOUGHTS THAT YOU WOULD BE BETTER OFF DEAD, OR OF HURTING YOURSELF: NOT AT ALL
5. POOR APPETITE OR OVEREATING: NOT AT ALL
SUM OF ALL RESPONSES TO PHQ QUESTIONS 1-9: 0
1. LITTLE INTEREST OR PLEASURE IN DOING THINGS: NOT AT ALL
4. FEELING TIRED OR HAVING LITTLE ENERGY: NOT AT ALL
7. TROUBLE CONCENTRATING ON THINGS, SUCH AS READING THE NEWSPAPER OR WATCHING TELEVISION: NOT AT ALL

## 2024-04-30 ASSESSMENT — ANXIETY QUESTIONNAIRES
5. BEING SO RESTLESS THAT IT IS HARD TO SIT STILL: NOT AT ALL
3. WORRYING TOO MUCH ABOUT DIFFERENT THINGS: NOT AT ALL
1. FEELING NERVOUS, ANXIOUS, OR ON EDGE: NOT AT ALL
4. TROUBLE RELAXING: NOT AT ALL
GAD7 TOTAL SCORE: 0
7. FEELING AFRAID AS IF SOMETHING AWFUL MIGHT HAPPEN: NOT AT ALL
6. BECOMING EASILY ANNOYED OR IRRITABLE: NOT AT ALL
IF YOU CHECKED OFF ANY PROBLEMS ON THIS QUESTIONNAIRE, HOW DIFFICULT HAVE THESE PROBLEMS MADE IT FOR YOU TO DO YOUR WORK, TAKE CARE OF THINGS AT HOME, OR GET ALONG WITH OTHER PEOPLE: NOT DIFFICULT AT ALL
2. NOT BEING ABLE TO STOP OR CONTROL WORRYING: NOT AT ALL

## 2024-04-30 ASSESSMENT — ENCOUNTER SYMPTOMS
CHEST TIGHTNESS: 0
EYE REDNESS: 0
BACK PAIN: 1
ABDOMINAL PAIN: 0
SHORTNESS OF BREATH: 0
WHEEZING: 0
FACIAL SWELLING: 0
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
## 2024-05-12 NOTE — PROGRESS NOTES
[facility]  [address], [address] [facility], [address] [place] [address]  Phone: [phone] Fax [phone]  Dr. Afshin Null        Patient: Cristy Lobato  Provider: Afshin Null MD     Procedure note: Botulinum Toxin injections     Indication: Cervical Dystonia        Subjective:      Mrs. Atilio Luis is a pleasant female with history of head tremor for several years. It is jerky and she feels a \"pulling\" sensation of the left ear towards the shoulder. No sensory tricks. Not much pain or stiffness. She has had several injections with us in the past.  Overall the injections have been met with benefit for her head jerking. She denies any adverse effects. She is overdue for injections and so has noted worsening breakthrough symptoms within the last few weeks. She has since followed up with pain management regarding her low back pain and they have been doing facet injections which have been helpful. Past medical history, surgical history, social history, family history, medications and allergies were all reviewed and updated as appropriate. Current Outpatient Medications on File Prior to Visit   Medication Sig Dispense Refill    diclofenac (VOLTAREN) 75 MG EC tablet       FLUoxetine (PROZAC) 20 MG capsule Take 2 capsules by mouth daily 180 capsule 1    lamoTRIgine (LAMICTAL) 150 MG tablet Take 1 tablet by mouth daily 90 tablet 1    buPROPion (WELLBUTRIN XL) 300 MG extended release tablet Take 1 tablet by mouth every morning 90 tablet 1    traZODone (DESYREL) 150 MG tablet Take 2 tablets by mouth nightly 180 tablet 1    clonazePAM (KLONOPIN) 0.5 MG tablet Take 1 tablet by mouth daily as needed for Anxiety for up to 90 days. Max Daily Amount: 0.5 mg 30 tablet 2    Multiple Vitamin (MULTIVITAMIN ADULT PO) Take 1 tablet by mouth daily      gabapentin (NEURONTIN) 300 MG capsule Take 1 capsule by mouth 3 times daily for 180 days.  90 capsule 5    acetaminophen (TYLENOL) 500 MG tablet Take by mouth
98.4

## 2024-05-14 ENCOUNTER — TELEPHONE (OUTPATIENT)
Dept: BEHAVIORAL/MENTAL HEALTH CLINIC | Age: 64
End: 2024-05-14

## 2024-05-15 ENCOUNTER — TELEMEDICINE (OUTPATIENT)
Dept: BEHAVIORAL/MENTAL HEALTH CLINIC | Age: 64
End: 2024-05-15
Payer: MEDICARE

## 2024-05-15 DIAGNOSIS — G89.4 CHRONIC PAIN DISORDER: ICD-10-CM

## 2024-05-15 DIAGNOSIS — F33.0 MILD EPISODE OF RECURRENT MAJOR DEPRESSIVE DISORDER (HCC): Primary | ICD-10-CM

## 2024-05-15 DIAGNOSIS — Z65.8 PSYCHOSOCIAL STRESSORS: ICD-10-CM

## 2024-05-15 DIAGNOSIS — F41.1 GENERALIZED ANXIETY DISORDER: ICD-10-CM

## 2024-05-15 DIAGNOSIS — F51.05 INSOMNIA DUE TO MENTAL DISORDER: ICD-10-CM

## 2024-05-15 PROCEDURE — G8427 DOCREV CUR MEDS BY ELIG CLIN: HCPCS | Performed by: PSYCHIATRY & NEUROLOGY

## 2024-05-15 PROCEDURE — 3017F COLORECTAL CA SCREEN DOC REV: CPT | Performed by: PSYCHIATRY & NEUROLOGY

## 2024-05-15 PROCEDURE — 99214 OFFICE O/P EST MOD 30 MIN: CPT | Performed by: PSYCHIATRY & NEUROLOGY

## 2024-05-15 RX ORDER — FLUOXETINE HYDROCHLORIDE 20 MG/1
40 CAPSULE ORAL DAILY
Qty: 180 CAPSULE | Refills: 1 | Status: SHIPPED | OUTPATIENT
Start: 2024-05-15

## 2024-05-15 RX ORDER — TRAZODONE HYDROCHLORIDE 150 MG/1
300 TABLET ORAL NIGHTLY
Qty: 180 TABLET | Refills: 1 | Status: SHIPPED | OUTPATIENT
Start: 2024-05-15

## 2024-05-15 RX ORDER — LAMOTRIGINE 150 MG/1
150 TABLET ORAL DAILY
Qty: 90 TABLET | Refills: 1 | Status: SHIPPED | OUTPATIENT
Start: 2024-05-15

## 2024-05-15 RX ORDER — GABAPENTIN 300 MG/1
300 CAPSULE ORAL 3 TIMES DAILY
Qty: 90 CAPSULE | Refills: 5 | Status: SHIPPED | OUTPATIENT
Start: 2024-05-15 | End: 2024-11-11

## 2024-05-15 RX ORDER — BUPROPION HYDROCHLORIDE 300 MG/1
300 TABLET ORAL EVERY MORNING
Qty: 90 TABLET | Refills: 1 | Status: SHIPPED | OUTPATIENT
Start: 2024-05-15

## 2024-05-15 RX ORDER — CLONAZEPAM 0.5 MG/1
0.5 TABLET ORAL DAILY PRN
Qty: 30 TABLET | Refills: 1 | Status: SHIPPED | OUTPATIENT
Start: 2024-05-15 | End: 2024-09-12

## 2024-05-15 ASSESSMENT — PATIENT HEALTH QUESTIONNAIRE - PHQ9
10. IF YOU CHECKED OFF ANY PROBLEMS, HOW DIFFICULT HAVE THESE PROBLEMS MADE IT FOR YOU TO DO YOUR WORK, TAKE CARE OF THINGS AT HOME, OR GET ALONG WITH OTHER PEOPLE: SOMEWHAT DIFFICULT
SUM OF ALL RESPONSES TO PHQ QUESTIONS 1-9: 8
8. MOVING OR SPEAKING SO SLOWLY THAT OTHER PEOPLE COULD HAVE NOTICED. OR THE OPPOSITE, BEING SO FIGETY OR RESTLESS THAT YOU HAVE BEEN MOVING AROUND A LOT MORE THAN USUAL: SEVERAL DAYS
3. TROUBLE FALLING OR STAYING ASLEEP: SEVERAL DAYS
4. FEELING TIRED OR HAVING LITTLE ENERGY: SEVERAL DAYS
SUM OF ALL RESPONSES TO PHQ QUESTIONS 1-9: 8
5. POOR APPETITE OR OVEREATING: SEVERAL DAYS
2. FEELING DOWN, DEPRESSED OR HOPELESS: SEVERAL DAYS
6. FEELING BAD ABOUT YOURSELF - OR THAT YOU ARE A FAILURE OR HAVE LET YOURSELF OR YOUR FAMILY DOWN: SEVERAL DAYS
7. TROUBLE CONCENTRATING ON THINGS, SUCH AS READING THE NEWSPAPER OR WATCHING TELEVISION: MORE THAN HALF THE DAYS
1. LITTLE INTEREST OR PLEASURE IN DOING THINGS: NOT AT ALL
SUM OF ALL RESPONSES TO PHQ QUESTIONS 1-9: 8
9. THOUGHTS THAT YOU WOULD BE BETTER OFF DEAD, OR OF HURTING YOURSELF: NOT AT ALL
SUM OF ALL RESPONSES TO PHQ9 QUESTIONS 1 & 2: 1
SUM OF ALL RESPONSES TO PHQ QUESTIONS 1-9: 8

## 2024-05-15 ASSESSMENT — ANXIETY QUESTIONNAIRES
2. NOT BEING ABLE TO STOP OR CONTROL WORRYING: SEVERAL DAYS
5. BEING SO RESTLESS THAT IT IS HARD TO SIT STILL: NOT AT ALL
GAD7 TOTAL SCORE: 7
IF YOU CHECKED OFF ANY PROBLEMS ON THIS QUESTIONNAIRE, HOW DIFFICULT HAVE THESE PROBLEMS MADE IT FOR YOU TO DO YOUR WORK, TAKE CARE OF THINGS AT HOME, OR GET ALONG WITH OTHER PEOPLE: SOMEWHAT DIFFICULT
3. WORRYING TOO MUCH ABOUT DIFFERENT THINGS: MORE THAN HALF THE DAYS
4. TROUBLE RELAXING: SEVERAL DAYS
6. BECOMING EASILY ANNOYED OR IRRITABLE: NOT AT ALL
7. FEELING AFRAID AS IF SOMETHING AWFUL MIGHT HAPPEN: NOT AT ALL
1. FEELING NERVOUS, ANXIOUS, OR ON EDGE: NEARLY EVERY DAY

## 2024-05-15 NOTE — PROGRESS NOTES
Place 1 each inside cheek    Ergocalciferol (DRISDOL) 200 MCG/ML drops Take 10,000 mLs by mouth (Patient not taking: Reported on 4/30/2024)     No current facility-administered medications for this visit.       Allergies   Allergen Reactions    Dust Mite Extract Other (See Comments)       Past Medical History, Past Surgical History, Family history, Social History, and Medications were all reviewed with the patient today and updated as necessary. Where available I have reviewed outside charts in epic and I have referenced care everywhere where possible.     Compliant with medication: Yes   Side effects from medications:  No           No data to display                   EXAMINATION  Musculoskeletal    GAIT AND STATION   [] WNL   [] RESTRICTED   [] UNSTEADY WALK        [] ABNORMAL   [] UNBALANCED  [] WHEELCHAIR/  WALKER/CANE     PSYCHIATRIC    PSYCHOMOTOR   [x]  WNL   [] RETARDATION   [] AGITATION            GENERAL APPEARANCE:   []  WELL GROOMED []     DISHEVELED   []  UNKEMPT      []  UNUSUAL/BIZZARE    [x] WNL       ATTITUDE:   [x] COOPERATIVE   [] GUARDED   [] SUSPICIOUS      [] HOSTILE                            BEHAVIOR:   [x] CALM   [] HYPERACTIVE   [] MANNERISMS      [] BIZZARE         SPEECH:   [] NORMAL FOR CLIENT   [x] STAMMERS   [] SLURRED   [] WHISPERING      [] LOUD   [] PRESSURED   [] ARTICULATE        EYE CONTACT:   [x] WNL   [] BLANK STARE   [] INTENSE      [] AVOIDANT         MOOD:   [] EUTHYMIC   [x] ANXIOUS   [x] DEPRESSED      [] IRRITABLE   [] ANGRY   [] APATHETIC     AFFECT:   [x] CONGRUENT WITH MOOD   [] FLAT   [] CONSTRICTED      [] INAPPROPRIATE   [] LABILE           THOUGHT PROCESS:   [x] LOGICAL/GOAL-DIRECTED   [] FOI   [] CIRCUMSANTIAL      [] INCOHERENT   [] TANGENTIAL   [] CONCRETE      [] PERSEVERATION           THOUGHT CONTENT:                DELUSIONS  [x] DENIES  [] GRANDIOSE  [] PERSECUTORY  [] Yarsanism  [] REFERENCE   HALLUCINATIONS  [x] DENIES  [] AUDITORY  [] VISUAL  []

## 2024-07-26 ENCOUNTER — TELEPHONE (OUTPATIENT)
Dept: BEHAVIORAL/MENTAL HEALTH CLINIC | Age: 64
End: 2024-07-26

## 2024-07-26 DIAGNOSIS — F41.1 GENERALIZED ANXIETY DISORDER: ICD-10-CM

## 2024-07-26 RX ORDER — CLONAZEPAM 0.5 MG/1
0.5 TABLET ORAL DAILY PRN
OUTPATIENT
Start: 2024-07-26 | End: 2024-11-23

## 2024-07-26 NOTE — TELEPHONE ENCOUNTER
When I contacted the pt to r/s her VV with Dr. Kelley on 10/7/24 the pt stated she is currently in [place] with her father, who is in the hospital. She noted there was one prescription refill that the pharmacy could not transfer from her pharmacy in [place]. She would like to see if this can be sent to [facility] at [address] [place] [address]. [place], [place].  The Rx is for her Clonazepam.  The pts CB# is [phone].  She stated it would be OK to LMOM if she cannot answer.

## 2024-07-26 NOTE — TELEPHONE ENCOUNTER
When I contacted the pt to r/s her VV with Dr. Kelley on 10/7/24 the pt stated she is currently in [place] with her father, who is in the hospital. She noted there was one prescription refill that the pharmacy could not transfer from her pharmacy in [place]. She would like to see if this can be sent to [facility] at [address] [place] [address]. [place], [place].  The Rx is for her Clonazepam.  The pts CB# is [phone].  She stated it would be OK to LMOM if she cannot answer.

## 2024-07-28 DIAGNOSIS — F41.1 GENERALIZED ANXIETY DISORDER: ICD-10-CM

## 2024-07-28 RX ORDER — CLONAZEPAM 0.5 MG/1
0.5 TABLET ORAL DAILY PRN
Qty: 30 TABLET | Refills: 0 | Status: SHIPPED | OUTPATIENT
Start: 2024-07-28 | End: 2024-11-25

## 2024-08-01 NOTE — TELEPHONE ENCOUNTER
This Rx was sent in as follows:   clonazePAM (KLONOPIN) 0.5 MG tablet 30 tablet 0 7/28/2024 11/25/2024    Sig - Route: Take 1 tablet by mouth daily as needed for Anxiety for up to 120 days. Max Daily Amount: 0.5 mg - Oral    Sent to pharmacy as: clonazePAM 0.5 MG Oral Tablet (KLONOPIN)    E-Prescribing Status: Receipt confirmed by pharmacy (7/28/2024 11:09 PM EDT)      Associated Diagnoses    Generalized anxiety disorder        Pharmacy    [facility] [address] [place] [address] [phone] -  [phone]

## 2024-09-09 ENCOUNTER — TELEPHONE (OUTPATIENT)
Dept: BEHAVIORAL/MENTAL HEALTH CLINIC | Age: 64
End: 2024-09-09

## 2024-09-09 DIAGNOSIS — F41.1 GENERALIZED ANXIETY DISORDER: ICD-10-CM

## 2024-09-09 RX ORDER — CLONAZEPAM 0.5 MG/1
0.5 TABLET ORAL DAILY PRN
Qty: 30 TABLET | Refills: 0 | Status: SHIPPED | OUTPATIENT
Start: 2024-09-09 | End: 2025-01-07

## 2024-09-12 ENCOUNTER — OFFICE VISIT (OUTPATIENT)
Dept: NEUROLOGY | Age: 64
End: 2024-09-12

## 2024-09-12 VITALS — HEART RATE: 80 BPM | DIASTOLIC BLOOD PRESSURE: 92 MMHG | OXYGEN SATURATION: 96 % | SYSTOLIC BLOOD PRESSURE: 144 MMHG

## 2024-09-12 DIAGNOSIS — M62.838 MUSCLE SPASM: ICD-10-CM

## 2024-09-12 DIAGNOSIS — G24.3 CERVICAL DYSTONIA: Primary | ICD-10-CM

## 2024-09-23 ASSESSMENT — ANXIETY QUESTIONNAIRES
IF YOU CHECKED OFF ANY PROBLEMS ON THIS QUESTIONNAIRE, HOW DIFFICULT HAVE THESE PROBLEMS MADE IT FOR YOU TO DO YOUR WORK, TAKE CARE OF THINGS AT HOME, OR GET ALONG WITH OTHER PEOPLE: EXTREMELY DIFFICULT
6. BECOMING EASILY ANNOYED OR IRRITABLE: MORE THAN HALF THE DAYS
IF YOU CHECKED OFF ANY PROBLEMS ON THIS QUESTIONNAIRE, HOW DIFFICULT HAVE THESE PROBLEMS MADE IT FOR YOU TO DO YOUR WORK, TAKE CARE OF THINGS AT HOME, OR GET ALONG WITH OTHER PEOPLE: EXTREMELY DIFFICULT
4. TROUBLE RELAXING: MORE THAN HALF THE DAYS
2. NOT BEING ABLE TO STOP OR CONTROL WORRYING: NEARLY EVERY DAY
6. BECOMING EASILY ANNOYED OR IRRITABLE: MORE THAN HALF THE DAYS
3. WORRYING TOO MUCH ABOUT DIFFERENT THINGS: NEARLY EVERY DAY
5. BEING SO RESTLESS THAT IT IS HARD TO SIT STILL: NOT AT ALL
7. FEELING AFRAID AS IF SOMETHING AWFUL MIGHT HAPPEN: NEARLY EVERY DAY
4. TROUBLE RELAXING: MORE THAN HALF THE DAYS
7. FEELING AFRAID AS IF SOMETHING AWFUL MIGHT HAPPEN: NEARLY EVERY DAY
2. NOT BEING ABLE TO STOP OR CONTROL WORRYING: NEARLY EVERY DAY
GAD7 TOTAL SCORE: 16
5. BEING SO RESTLESS THAT IT IS HARD TO SIT STILL: NOT AT ALL
1. FEELING NERVOUS, ANXIOUS, OR ON EDGE: NEARLY EVERY DAY
1. FEELING NERVOUS, ANXIOUS, OR ON EDGE: NEARLY EVERY DAY
3. WORRYING TOO MUCH ABOUT DIFFERENT THINGS: NEARLY EVERY DAY

## 2024-09-23 ASSESSMENT — PATIENT HEALTH QUESTIONNAIRE - PHQ9
SUM OF ALL RESPONSES TO PHQ QUESTIONS 1-9: 21
8. MOVING OR SPEAKING SO SLOWLY THAT OTHER PEOPLE COULD HAVE NOTICED. OR THE OPPOSITE - BEING SO FIDGETY OR RESTLESS THAT YOU HAVE BEEN MOVING AROUND A LOT MORE THAN USUAL: NOT AT ALL
4. FEELING TIRED OR HAVING LITTLE ENERGY: NEARLY EVERY DAY
9. THOUGHTS THAT YOU WOULD BE BETTER OFF DEAD, OR OF HURTING YOURSELF: NOT AT ALL
SUM OF ALL RESPONSES TO PHQ QUESTIONS 1-9: 21
SUM OF ALL RESPONSES TO PHQ QUESTIONS 1-9: 21
3. TROUBLE FALLING OR STAYING ASLEEP: NEARLY EVERY DAY
9. THOUGHTS THAT YOU WOULD BE BETTER OFF DEAD, OR OF HURTING YOURSELF: NOT AT ALL
SUM OF ALL RESPONSES TO PHQ9 QUESTIONS 1 & 2: 6
6. FEELING BAD ABOUT YOURSELF - OR THAT YOU ARE A FAILURE OR HAVE LET YOURSELF OR YOUR FAMILY DOWN: NEARLY EVERY DAY
10. IF YOU CHECKED OFF ANY PROBLEMS, HOW DIFFICULT HAVE THESE PROBLEMS MADE IT FOR YOU TO DO YOUR WORK, TAKE CARE OF THINGS AT HOME, OR GET ALONG WITH OTHER PEOPLE: EXTREMELY DIFFICULT
6. FEELING BAD ABOUT YOURSELF - OR THAT YOU ARE A FAILURE OR HAVE LET YOURSELF OR YOUR FAMILY DOWN: NEARLY EVERY DAY
8. MOVING OR SPEAKING SO SLOWLY THAT OTHER PEOPLE COULD HAVE NOTICED. OR THE OPPOSITE, BEING SO FIGETY OR RESTLESS THAT YOU HAVE BEEN MOVING AROUND A LOT MORE THAN USUAL: NOT AT ALL
1. LITTLE INTEREST OR PLEASURE IN DOING THINGS: NEARLY EVERY DAY
7. TROUBLE CONCENTRATING ON THINGS, SUCH AS READING THE NEWSPAPER OR WATCHING TELEVISION: NEARLY EVERY DAY
SUM OF ALL RESPONSES TO PHQ QUESTIONS 1-9: 21
10. IF YOU CHECKED OFF ANY PROBLEMS, HOW DIFFICULT HAVE THESE PROBLEMS MADE IT FOR YOU TO DO YOUR WORK, TAKE CARE OF THINGS AT HOME, OR GET ALONG WITH OTHER PEOPLE: EXTREMELY DIFFICULT
3. TROUBLE FALLING OR STAYING ASLEEP: NEARLY EVERY DAY
5. POOR APPETITE OR OVEREATING: NEARLY EVERY DAY
7. TROUBLE CONCENTRATING ON THINGS, SUCH AS READING THE NEWSPAPER OR WATCHING TELEVISION: NEARLY EVERY DAY
1. LITTLE INTEREST OR PLEASURE IN DOING THINGS: NEARLY EVERY DAY
SUM OF ALL RESPONSES TO PHQ QUESTIONS 1-9: 21
4. FEELING TIRED OR HAVING LITTLE ENERGY: NEARLY EVERY DAY
2. FEELING DOWN, DEPRESSED OR HOPELESS: NEARLY EVERY DAY
5. POOR APPETITE OR OVEREATING: NEARLY EVERY DAY
2. FEELING DOWN, DEPRESSED OR HOPELESS: NEARLY EVERY DAY

## 2024-09-23 ASSESSMENT — COLUMBIA-SUICIDE SEVERITY RATING SCALE - C-SSRS
2. IN THE PAST MONTH, HAVE YOU ACTUALLY HAD ANY THOUGHTS OF KILLING YOURSELF?: NO
7. DID THIS OCCUR IN THE LAST THREE MONTHS: NO
6. IN YOUR LIFETIME, HAVE YOU EVER DONE ANYTHING, STARTED TO DO ANYTHING, OR PREPARED TO DO ANYTHING TO END YOUR LIFE?: YES
1. IN THE PAST MONTH, HAVE YOU WISHED YOU WERE DEAD OR WISHED YOU COULD GO TO SLEEP AND NOT WAKE UP?: NO

## 2024-09-25 ENCOUNTER — TELEMEDICINE (OUTPATIENT)
Dept: BEHAVIORAL/MENTAL HEALTH CLINIC | Age: 64
End: 2024-09-25

## 2024-09-25 DIAGNOSIS — G89.4 CHRONIC PAIN DISORDER: ICD-10-CM

## 2024-09-25 DIAGNOSIS — F33.2 SEVERE EPISODE OF RECURRENT MAJOR DEPRESSIVE DISORDER, WITHOUT PSYCHOTIC FEATURES (HCC): Primary | ICD-10-CM

## 2024-09-25 DIAGNOSIS — Z65.8 PSYCHOSOCIAL STRESSORS: ICD-10-CM

## 2024-09-25 DIAGNOSIS — F41.1 GENERALIZED ANXIETY DISORDER: ICD-10-CM

## 2024-09-25 DIAGNOSIS — F51.05 INSOMNIA DUE TO MENTAL DISORDER: ICD-10-CM

## 2024-09-25 RX ORDER — GABAPENTIN 300 MG/1
300 CAPSULE ORAL 3 TIMES DAILY
Qty: 90 CAPSULE | Refills: 5 | Status: SHIPPED | OUTPATIENT
Start: 2024-09-25 | End: 2025-03-24

## 2024-09-25 RX ORDER — LAMOTRIGINE 100 MG/1
100 TABLET ORAL 2 TIMES DAILY
Qty: 60 TABLET | Refills: 3 | Status: SHIPPED | OUTPATIENT
Start: 2024-09-25

## 2024-09-25 RX ORDER — TRAZODONE HYDROCHLORIDE 150 MG/1
150 TABLET ORAL NIGHTLY
Qty: 90 TABLET | Refills: 1 | Status: SHIPPED | OUTPATIENT
Start: 2024-09-25

## 2024-09-25 RX ORDER — CLONAZEPAM 0.5 MG/1
0.5 TABLET ORAL DAILY PRN
Qty: 30 TABLET | Refills: 0 | Status: SHIPPED | OUTPATIENT
Start: 2024-09-25 | End: 2025-01-23

## 2024-09-25 RX ORDER — BUPROPION HYDROCHLORIDE 300 MG/1
300 TABLET ORAL EVERY MORNING
Qty: 90 TABLET | Refills: 1 | Status: SHIPPED | OUTPATIENT
Start: 2024-09-25

## 2024-10-07 ENCOUNTER — TELEPHONE (OUTPATIENT)
Dept: FAMILY MEDICINE CLINIC | Facility: CLINIC | Age: 64
End: 2024-10-07

## 2024-10-14 ENCOUNTER — TELEPHONE (OUTPATIENT)
Dept: BEHAVIORAL/MENTAL HEALTH CLINIC | Age: 64
End: 2024-10-14

## 2024-10-14 NOTE — TELEPHONE ENCOUNTER
Received signed records request to send records for the last 3 years to [facility] fax [phone]

## 2024-10-18 ENCOUNTER — TELEPHONE (OUTPATIENT)
Dept: BEHAVIORAL/MENTAL HEALTH CLINIC | Age: 64
End: 2024-10-18

## 2024-10-18 NOTE — TELEPHONE ENCOUNTER
Pt requesting medication refill on klonopin 0.5 mg please send to [facility]  pharmacy, thank you.

## 2024-10-18 NOTE — TELEPHONE ENCOUNTER
Spoke with pharmacy, patient has refill on file and will need to get this from pharmacy. They advised that when she requested it, it was too soon to fill but can be filled now and they will get it ready and will text the patient when it is ready.  Left message for patient .

## 2024-10-28 ASSESSMENT — ANXIETY QUESTIONNAIRES
IF YOU CHECKED OFF ANY PROBLEMS ON THIS QUESTIONNAIRE, HOW DIFFICULT HAVE THESE PROBLEMS MADE IT FOR YOU TO DO YOUR WORK, TAKE CARE OF THINGS AT HOME, OR GET ALONG WITH OTHER PEOPLE: SOMEWHAT DIFFICULT
5. BEING SO RESTLESS THAT IT IS HARD TO SIT STILL: NOT AT ALL
GAD7 TOTAL SCORE: 7
7. FEELING AFRAID AS IF SOMETHING AWFUL MIGHT HAPPEN: NEARLY EVERY DAY
1. FEELING NERVOUS, ANXIOUS, OR ON EDGE: SEVERAL DAYS
2. NOT BEING ABLE TO STOP OR CONTROL WORRYING: SEVERAL DAYS
1. FEELING NERVOUS, ANXIOUS, OR ON EDGE: SEVERAL DAYS
2. NOT BEING ABLE TO STOP OR CONTROL WORRYING: SEVERAL DAYS
3. WORRYING TOO MUCH ABOUT DIFFERENT THINGS: SEVERAL DAYS
7. FEELING AFRAID AS IF SOMETHING AWFUL MIGHT HAPPEN: NEARLY EVERY DAY
4. TROUBLE RELAXING: SEVERAL DAYS
4. TROUBLE RELAXING: SEVERAL DAYS
IF YOU CHECKED OFF ANY PROBLEMS ON THIS QUESTIONNAIRE, HOW DIFFICULT HAVE THESE PROBLEMS MADE IT FOR YOU TO DO YOUR WORK, TAKE CARE OF THINGS AT HOME, OR GET ALONG WITH OTHER PEOPLE: SOMEWHAT DIFFICULT
6. BECOMING EASILY ANNOYED OR IRRITABLE: NOT AT ALL
6. BECOMING EASILY ANNOYED OR IRRITABLE: NOT AT ALL
3. WORRYING TOO MUCH ABOUT DIFFERENT THINGS: SEVERAL DAYS
5. BEING SO RESTLESS THAT IT IS HARD TO SIT STILL: NOT AT ALL

## 2024-10-28 ASSESSMENT — PATIENT HEALTH QUESTIONNAIRE - PHQ9
5. POOR APPETITE OR OVEREATING: MORE THAN HALF THE DAYS
3. TROUBLE FALLING OR STAYING ASLEEP: MORE THAN HALF THE DAYS
9. THOUGHTS THAT YOU WOULD BE BETTER OFF DEAD, OR OF HURTING YOURSELF: NOT AT ALL
9. THOUGHTS THAT YOU WOULD BE BETTER OFF DEAD, OR OF HURTING YOURSELF: NOT AT ALL
SUM OF ALL RESPONSES TO PHQ QUESTIONS 1-9: 12
1. LITTLE INTEREST OR PLEASURE IN DOING THINGS: SEVERAL DAYS
SUM OF ALL RESPONSES TO PHQ QUESTIONS 1-9: 12
1. LITTLE INTEREST OR PLEASURE IN DOING THINGS: SEVERAL DAYS
2. FEELING DOWN, DEPRESSED OR HOPELESS: SEVERAL DAYS
SUM OF ALL RESPONSES TO PHQ QUESTIONS 1-9: 12
8. MOVING OR SPEAKING SO SLOWLY THAT OTHER PEOPLE COULD HAVE NOTICED. OR THE OPPOSITE - BEING SO FIDGETY OR RESTLESS THAT YOU HAVE BEEN MOVING AROUND A LOT MORE THAN USUAL: SEVERAL DAYS
SUM OF ALL RESPONSES TO PHQ QUESTIONS 1-9: 12
SUM OF ALL RESPONSES TO PHQ QUESTIONS 1-9: 12
8. MOVING OR SPEAKING SO SLOWLY THAT OTHER PEOPLE COULD HAVE NOTICED. OR THE OPPOSITE, BEING SO FIGETY OR RESTLESS THAT YOU HAVE BEEN MOVING AROUND A LOT MORE THAN USUAL: SEVERAL DAYS
7. TROUBLE CONCENTRATING ON THINGS, SUCH AS READING THE NEWSPAPER OR WATCHING TELEVISION: MORE THAN HALF THE DAYS
3. TROUBLE FALLING OR STAYING ASLEEP: MORE THAN HALF THE DAYS
10. IF YOU CHECKED OFF ANY PROBLEMS, HOW DIFFICULT HAVE THESE PROBLEMS MADE IT FOR YOU TO DO YOUR WORK, TAKE CARE OF THINGS AT HOME, OR GET ALONG WITH OTHER PEOPLE: SOMEWHAT DIFFICULT
4. FEELING TIRED OR HAVING LITTLE ENERGY: MORE THAN HALF THE DAYS
6. FEELING BAD ABOUT YOURSELF - OR THAT YOU ARE A FAILURE OR HAVE LET YOURSELF OR YOUR FAMILY DOWN: SEVERAL DAYS
SUM OF ALL RESPONSES TO PHQ9 QUESTIONS 1 & 2: 2
7. TROUBLE CONCENTRATING ON THINGS, SUCH AS READING THE NEWSPAPER OR WATCHING TELEVISION: MORE THAN HALF THE DAYS
4. FEELING TIRED OR HAVING LITTLE ENERGY: MORE THAN HALF THE DAYS
10. IF YOU CHECKED OFF ANY PROBLEMS, HOW DIFFICULT HAVE THESE PROBLEMS MADE IT FOR YOU TO DO YOUR WORK, TAKE CARE OF THINGS AT HOME, OR GET ALONG WITH OTHER PEOPLE: SOMEWHAT DIFFICULT
6. FEELING BAD ABOUT YOURSELF - OR THAT YOU ARE A FAILURE OR HAVE LET YOURSELF OR YOUR FAMILY DOWN: SEVERAL DAYS
5. POOR APPETITE OR OVEREATING: MORE THAN HALF THE DAYS
2. FEELING DOWN, DEPRESSED OR HOPELESS: SEVERAL DAYS

## 2024-10-30 ENCOUNTER — TELEMEDICINE (OUTPATIENT)
Dept: BEHAVIORAL/MENTAL HEALTH CLINIC | Age: 64
End: 2024-10-30
Payer: MEDICARE

## 2024-10-30 DIAGNOSIS — F51.05 INSOMNIA DUE TO MENTAL DISORDER: ICD-10-CM

## 2024-10-30 DIAGNOSIS — Z65.8 PSYCHOSOCIAL STRESSORS: ICD-10-CM

## 2024-10-30 DIAGNOSIS — F33.0 MILD EPISODE OF RECURRENT MAJOR DEPRESSIVE DISORDER (HCC): Primary | ICD-10-CM

## 2024-10-30 DIAGNOSIS — F41.1 GENERALIZED ANXIETY DISORDER: ICD-10-CM

## 2024-10-30 DIAGNOSIS — G89.4 CHRONIC PAIN DISORDER: ICD-10-CM

## 2024-10-30 PROCEDURE — 99214 OFFICE O/P EST MOD 30 MIN: CPT | Performed by: PSYCHIATRY & NEUROLOGY

## 2024-10-30 PROCEDURE — 3017F COLORECTAL CA SCREEN DOC REV: CPT | Performed by: PSYCHIATRY & NEUROLOGY

## 2024-10-30 PROCEDURE — G8427 DOCREV CUR MEDS BY ELIG CLIN: HCPCS | Performed by: PSYCHIATRY & NEUROLOGY

## 2024-10-30 RX ORDER — LAMOTRIGINE 150 MG/1
150 TABLET ORAL DAILY
Qty: 90 TABLET | Refills: 1 | Status: SHIPPED | OUTPATIENT
Start: 2024-10-30

## 2024-10-30 RX ORDER — CLONAZEPAM 0.5 MG/1
0.5 TABLET ORAL DAILY PRN
Qty: 30 TABLET | Refills: 2 | Status: SHIPPED | OUTPATIENT
Start: 2024-10-30 | End: 2025-02-27

## 2024-10-30 RX ORDER — GABAPENTIN 300 MG/1
300 CAPSULE ORAL 3 TIMES DAILY
Qty: 90 CAPSULE | Refills: 5 | Status: SHIPPED | OUTPATIENT
Start: 2024-10-30 | End: 2025-04-28

## 2024-10-30 RX ORDER — BUPROPION HYDROCHLORIDE 300 MG/1
300 TABLET ORAL EVERY MORNING
Qty: 90 TABLET | Refills: 1 | Status: SHIPPED | OUTPATIENT
Start: 2024-10-30

## 2024-10-30 RX ORDER — TRAZODONE HYDROCHLORIDE 150 MG/1
150 TABLET ORAL NIGHTLY
Qty: 90 TABLET | Refills: 1 | Status: SHIPPED | OUTPATIENT
Start: 2024-10-30

## 2024-10-30 NOTE — PROGRESS NOTES
her life at this time, weight management d/w the patient. Sleep hygiene d/w patient. Patient allowed to vent out her emotions.  Scenarios were reviewed using CBT  techniques in order to increase insight and decrease anxiety.    Dysfunctional cognitions challenged and alternate options dicussed.      []  Disposition planning      []  Dangerous and will not contract for safety in the community    **Pateint has been notified: They are to call 911 or go to their nearest E.R. if they are experiencing a medical emergency or suicidal ideations/homicidal ideations.**  All ancillary documentation entered reviewed by provider.      PLEASE NOTE:  This document has been produced in part or whole using voice recognition software. Proofread however unrecognized errors in transcription may be present.    MD Ying Carr, was evaluated through a synchronous (real-time) audio-video encounter. The patient (or guardian if applicable) is aware that this is a billable service, which includes applicable co-pays. This Virtual Visit was conducted with patient's (and/or legal guardian's) consent. Patient identification was verified, and a caregiver was present when appropriate.   The patient was located at Home: [address] [place] [id number]  Provider was located at Home (Appt Dept State): [place]  Confirm you are appropriately licensed, registered, or certified to deliver care in the state where the patient is located as indicated above. If you are not or unsure, please re-schedule the visit: Yes, I confirm.        Total time spent for this encounter: Not billed by time    --Cassie Kelley MD on 10/30/2024 at 6:27 PM    An electronic signature was used to authenticate this note.

## 2024-12-12 ENCOUNTER — OFFICE VISIT (OUTPATIENT)
Dept: NEUROLOGY | Age: 64
End: 2024-12-12

## 2024-12-12 VITALS — DIASTOLIC BLOOD PRESSURE: 60 MMHG | HEART RATE: 82 BPM | SYSTOLIC BLOOD PRESSURE: 117 MMHG

## 2024-12-12 DIAGNOSIS — G24.3 CERVICAL DYSTONIA: Primary | ICD-10-CM

## 2024-12-12 DIAGNOSIS — M62.838 MUSCLE SPASM: ICD-10-CM

## 2024-12-12 NOTE — PROGRESS NOTES
[facility] NEUROLOGY  [address], [address]  [place] [address]  Phone: [phone] Fax [phone]  Dr. Praveen Sotomayor        Patient: Ying Mckeon  Provider: Praveen Sotomayor MD     Procedure note:  Botulinum Toxin injections     Indication: Cervical Dystonia        Subjective:      Mrs. Mckeon is a RH 64 y.o. female with history of cervical dystonia and dystonic head tremor.    She is unaccompanied for today's visit.  She was last seen September 2024.    Patient presents for follow-up and chemodenervation for her cervical dystonia with a dystonic head tremor which has been present for several years.    She has a jerking sensation in her neck and a \"pulling\" sensation of the head to the left.  She is not aware of any sensory tricks.  She does note some muscle spasm and stiffness.    She has had several injections with us over the past couple of years. Overall the injections have been met with benefit for her head jerking.  She denies any adverse effects.  Has tried muscle relaxers in the past including baclofen but no longer taking it.    Unfortunately has had an increased level of stress caring for her parents and this has, not unsurprisingly, resulted in worsening symptoms recently.  She can have speech impairment with stuttering which has been felt to be related to increasing anxiety.  Her anxiety is being treated by Dr. Kelley.  She does take clonazepam as needed.      Recent episode of vertigo lasting over the last couple of weeks, currently getting vestibular PT.       Past medical history, surgical history, social history, family history, medications and allergies were all reviewed and updated as appropriate.      Current Outpatient Medications on File Prior to Visit   Medication Sig Dispense Refill    buPROPion (WELLBUTRIN XL) 300 MG extended release tablet Take 1 tablet by mouth every morning 90 tablet 1    clonazePAM (KLONOPIN) 0.5 MG tablet Take 1 tablet by mouth daily as needed for Anxiety

## 2025-01-22 ENCOUNTER — TELEMEDICINE (OUTPATIENT)
Dept: BEHAVIORAL/MENTAL HEALTH CLINIC | Age: 65
End: 2025-01-22
Payer: MEDICARE

## 2025-01-22 DIAGNOSIS — Z65.8 PSYCHOSOCIAL STRESSORS: ICD-10-CM

## 2025-01-22 DIAGNOSIS — G89.4 CHRONIC PAIN DISORDER: ICD-10-CM

## 2025-01-22 DIAGNOSIS — F33.1 MODERATE EPISODE OF RECURRENT MAJOR DEPRESSIVE DISORDER (HCC): Primary | ICD-10-CM

## 2025-01-22 DIAGNOSIS — F51.05 INSOMNIA DUE TO MENTAL DISORDER: ICD-10-CM

## 2025-01-22 DIAGNOSIS — F41.1 GENERALIZED ANXIETY DISORDER: ICD-10-CM

## 2025-01-22 PROCEDURE — G8427 DOCREV CUR MEDS BY ELIG CLIN: HCPCS | Performed by: PSYCHIATRY & NEUROLOGY

## 2025-01-22 PROCEDURE — 3017F COLORECTAL CA SCREEN DOC REV: CPT | Performed by: PSYCHIATRY & NEUROLOGY

## 2025-01-22 PROCEDURE — 99214 OFFICE O/P EST MOD 30 MIN: CPT | Performed by: PSYCHIATRY & NEUROLOGY

## 2025-01-22 RX ORDER — GABAPENTIN 300 MG/1
300 CAPSULE ORAL 3 TIMES DAILY
Qty: 90 CAPSULE | Refills: 5 | Status: SHIPPED | OUTPATIENT
Start: 2025-01-22 | End: 2025-07-21

## 2025-01-22 RX ORDER — BUPROPION HYDROCHLORIDE 300 MG/1
300 TABLET ORAL EVERY MORNING
Qty: 90 TABLET | Refills: 1 | Status: SHIPPED | OUTPATIENT
Start: 2025-01-22

## 2025-01-22 RX ORDER — CLONAZEPAM 0.5 MG/1
0.5 TABLET ORAL DAILY PRN
Qty: 30 TABLET | Refills: 2 | Status: SHIPPED | OUTPATIENT
Start: 2025-01-22 | End: 2025-05-22

## 2025-01-22 RX ORDER — TRAZODONE HYDROCHLORIDE 100 MG/1
200 TABLET ORAL NIGHTLY
Qty: 60 TABLET | Refills: 3 | Status: SHIPPED | OUTPATIENT
Start: 2025-01-22

## 2025-01-22 RX ORDER — MECLIZINE HYDROCHLORIDE 25 MG/1
TABLET ORAL
COMMUNITY
Start: 2024-11-12

## 2025-01-22 RX ORDER — LAMOTRIGINE 150 MG/1
150 TABLET ORAL DAILY
Qty: 90 TABLET | Refills: 1 | Status: SHIPPED | OUTPATIENT
Start: 2025-01-22

## 2025-01-22 RX ORDER — ONDANSETRON 4 MG/1
4 TABLET, FILM COATED ORAL EVERY 6 HOURS PRN
COMMUNITY
Start: 2024-11-12

## 2025-01-22 ASSESSMENT — ANXIETY QUESTIONNAIRES
5. BEING SO RESTLESS THAT IT IS HARD TO SIT STILL: NOT AT ALL
4. TROUBLE RELAXING: SEVERAL DAYS
3. WORRYING TOO MUCH ABOUT DIFFERENT THINGS: SEVERAL DAYS
6. BECOMING EASILY ANNOYED OR IRRITABLE: NOT AT ALL
1. FEELING NERVOUS, ANXIOUS, OR ON EDGE: NEARLY EVERY DAY
2. NOT BEING ABLE TO STOP OR CONTROL WORRYING: NOT AT ALL
IF YOU CHECKED OFF ANY PROBLEMS ON THIS QUESTIONNAIRE, HOW DIFFICULT HAVE THESE PROBLEMS MADE IT FOR YOU TO DO YOUR WORK, TAKE CARE OF THINGS AT HOME, OR GET ALONG WITH OTHER PEOPLE: VERY DIFFICULT
7. FEELING AFRAID AS IF SOMETHING AWFUL MIGHT HAPPEN: SEVERAL DAYS
GAD7 TOTAL SCORE: 6

## 2025-01-22 ASSESSMENT — PATIENT HEALTH QUESTIONNAIRE - PHQ9
SUM OF ALL RESPONSES TO PHQ QUESTIONS 1-9: 18
9. THOUGHTS THAT YOU WOULD BE BETTER OFF DEAD, OR OF HURTING YOURSELF: NOT AT ALL
5. POOR APPETITE OR OVEREATING: SEVERAL DAYS
4. FEELING TIRED OR HAVING LITTLE ENERGY: NEARLY EVERY DAY
2. FEELING DOWN, DEPRESSED OR HOPELESS: NEARLY EVERY DAY
3. TROUBLE FALLING OR STAYING ASLEEP: SEVERAL DAYS
10. IF YOU CHECKED OFF ANY PROBLEMS, HOW DIFFICULT HAVE THESE PROBLEMS MADE IT FOR YOU TO DO YOUR WORK, TAKE CARE OF THINGS AT HOME, OR GET ALONG WITH OTHER PEOPLE: VERY DIFFICULT
8. MOVING OR SPEAKING SO SLOWLY THAT OTHER PEOPLE COULD HAVE NOTICED. OR THE OPPOSITE, BEING SO FIGETY OR RESTLESS THAT YOU HAVE BEEN MOVING AROUND A LOT MORE THAN USUAL: NEARLY EVERY DAY
6. FEELING BAD ABOUT YOURSELF - OR THAT YOU ARE A FAILURE OR HAVE LET YOURSELF OR YOUR FAMILY DOWN: SEVERAL DAYS
7. TROUBLE CONCENTRATING ON THINGS, SUCH AS READING THE NEWSPAPER OR WATCHING TELEVISION: NEARLY EVERY DAY
SUM OF ALL RESPONSES TO PHQ9 QUESTIONS 1 & 2: 6
1. LITTLE INTEREST OR PLEASURE IN DOING THINGS: NEARLY EVERY DAY
SUM OF ALL RESPONSES TO PHQ QUESTIONS 1-9: 18

## 2025-01-22 NOTE — PROGRESS NOTES
Patient:  Ying Mckeon  Age:  64 y.o.  :  1960     SEX:  female MRN:  [id number]     RACE: White (non-)     SEEN:  [x]  PATIENT  []  SPOUSE []  OTHER:                  2025     9:41 AM 10/28/2024    12:17 PM 2024    10:12 AM   PHQ-9    Little interest or pleasure in doing things 3 1 3   Feeling down, depressed, or hopeless 3 1 3   Trouble falling or staying asleep, or sleeping too much 1 2 3   Feeling tired or having little energy 3 2 3   Poor appetite or overeating 1 2 3   Feeling bad about yourself - or that you are a failure or have let yourself or your family down 1 1 3   Trouble concentrating on things, such as reading the newspaper or watching television 3 2 3   Moving or speaking so slowly that other people could have noticed. Or the opposite - being so fidgety or restless that you have been moving around a lot more than usual 3 1 0   Thoughts that you would be better off dead, or of hurting yourself in some way 0 0 0   PHQ-2 Score 6 2 6   PHQ-9 Total Score 18 12 21   If you checked off any problems, how difficult have these problems made it for you to do your work, take care of things at home, or get along with other people? 2 1 3           2025     9:44 AM 10/28/2024    12:14 PM 2024    10:06 AM   GAURAV-7 SCREENING   Feeling nervous, anxious, or on edge Nearly every day Several days Nearly every day   Not being able to stop or control worrying Not at all Several days Nearly every day   Worrying too much about different things Several days Several days Nearly every day   Trouble relaxing Several days Several days More than half the days   Being so restless that it is hard to sit still Not at all Not at all Not at all   Becoming easily annoyed or irritable Not at all Not at all More than half the days   Feeling afraid as if something awful might happen Several days Nearly every day Nearly every day   GAURAV-7 Total Score 6 7 16   How difficult have these problems

## 2025-02-13 ENCOUNTER — TRANSCRIBE ORDERS (OUTPATIENT)
Dept: SCHEDULING | Age: 65
End: 2025-02-13

## 2025-02-13 DIAGNOSIS — Z12.31 VISIT FOR SCREENING MAMMOGRAM: Primary | ICD-10-CM

## 2025-02-21 ENCOUNTER — PATIENT MESSAGE (OUTPATIENT)
Dept: NEUROLOGY | Age: 65
End: 2025-02-21

## 2025-02-21 DIAGNOSIS — M62.838 MUSCLE SPASM: ICD-10-CM

## 2025-02-21 DIAGNOSIS — G24.3 CERVICAL DYSTONIA: Primary | ICD-10-CM

## 2025-02-24 RX ORDER — TIZANIDINE 2 MG/1
2 TABLET ORAL EVERY 8 HOURS PRN
Qty: 30 TABLET | Refills: 2 | Status: SHIPPED | OUTPATIENT
Start: 2025-02-24 | End: 2025-02-27

## 2025-02-27 RX ORDER — CYCLOBENZAPRINE HCL 10 MG
5-10 TABLET ORAL 3 TIMES DAILY PRN
Qty: 21 TABLET | Refills: 1 | Status: SHIPPED | OUTPATIENT
Start: 2025-02-27 | End: 2025-03-13

## 2025-03-03 ENCOUNTER — TELEPHONE (OUTPATIENT)
Dept: BEHAVIORAL/MENTAL HEALTH CLINIC | Age: 65
End: 2025-03-03

## 2025-03-03 NOTE — TELEPHONE ENCOUNTER
Received a message from Dr Connie Ybarra at [facility]. 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 They have enrolled her in the controlled substance monitoring program with their office.    Additionally, they have asked for her to decrease her Trazodone to one 150mg table at night to avoid oversedation.

## 2025-03-18 ENCOUNTER — PATIENT MESSAGE (OUTPATIENT)
Dept: NEUROLOGY | Age: 65
End: 2025-03-18

## 2025-03-20 ENCOUNTER — TRANSCRIBE ORDERS (OUTPATIENT)
Facility: HOSPITAL | Age: 65
End: 2025-03-20

## 2025-03-20 ENCOUNTER — APPOINTMENT (OUTPATIENT)
Dept: MAMMOGRAPHY | Age: 65
End: 2025-03-20
Payer: MEDICARE

## 2025-03-20 ENCOUNTER — HOSPITAL ENCOUNTER (OUTPATIENT)
Dept: MAMMOGRAPHY | Age: 65
Discharge: HOME | End: 2025-03-23
Payer: MEDICARE

## 2025-03-20 DIAGNOSIS — N63.21 UNSPECIFIED LUMP IN THE LEFT BREAST, UPPER OUTER QUADRANT: ICD-10-CM

## 2025-03-20 DIAGNOSIS — N63.15 UNSPECIFIED LUMP IN THE RIGHT BREAST, OVERLAPPING QUADRANTS: Primary | ICD-10-CM

## 2025-03-20 DIAGNOSIS — N63.15 UNSPECIFIED LUMP IN THE RIGHT BREAST, OVERLAPPING QUADRANTS: ICD-10-CM

## 2025-03-20 DIAGNOSIS — N63.0 MASS OF BREAST, UNSPECIFIED LATERALITY: ICD-10-CM

## 2025-03-20 PROCEDURE — G0279 TOMOSYNTHESIS, MAMMO: HCPCS

## 2025-03-20 PROCEDURE — 76642 ULTRASOUND BREAST LIMITED: CPT

## 2025-03-27 ENCOUNTER — OFFICE VISIT (OUTPATIENT)
Dept: NEUROLOGY | Age: 65
End: 2025-03-27

## 2025-03-27 VITALS
HEART RATE: 68 BPM | HEIGHT: 69 IN | BODY MASS INDEX: 17.87 KG/M2 | DIASTOLIC BLOOD PRESSURE: 82 MMHG | SYSTOLIC BLOOD PRESSURE: 136 MMHG

## 2025-03-27 DIAGNOSIS — G24.3 CERVICAL DYSTONIA: Primary | ICD-10-CM

## 2025-03-27 NOTE — PROGRESS NOTES
parkinsonism.    Cervical Dystonia:   Proceed with botulinum toxin injections as noted below.  Dosage increase as noted below, additional sample was used.    Muscle spasm:  Discussed use of other muscle relaxers as needed for breakthrough symptoms.  Previous trials of baclofen were without benefit.      RTC 3 months for review and re-injection.        Procedure:       Informed consent was obtained after the potential risks and benefits have been explained to the patient.  Potential risks include:  Pain, bruising, bleeding, infection, flu-like symptoms, over-weakening of injected or adjacent muscles and swallowing dysfunction. Patient was given the botulinum toxin medication guide according to FDA standards.      Procedure:  200 units of Botox was diluted into 2 cc of normal saline.  An additional 50 units of sample were included in today's injection.    Using a 30 gauge 1.4 inch hollow lumen recording needle on a tuberculin syringe was used to inject bolutinum toxin in the muscles noted below.  The following muscles were sampled utilizing EMG and injected as noted:       Injection:                                                        L splenius capitis 60 units  L splenius cervicis 20 units  L longissimus  40 units  L semispinalis  10 units  L SCM   25 units    R splenius capitis 30 units  R SCM   40 units  R longissimus  15 units  R semispinalis  10 units       Total injected: 250 units BOTOX 100units/1 cc under EMG guidance.   Waste: 0 units     Comments: There were no complications.  The patient tolerated the procedure well.       She will follow up in 3 months for review/re-injection.         Signature: Praveen Sotomayor MD  Date: 3/27/25  [address], [place] [address]  Ph: [phone]  Fax: [phone]       I have personally interviewed and examined Mr. Ying Mckeon and I have personally reviewed all relevant records including labs and imaging as noted above. I

## 2025-03-31 ENCOUNTER — INITIAL CONSULT (OUTPATIENT)
Age: 65
End: 2025-03-31
Payer: MEDICARE

## 2025-03-31 VITALS
WEIGHT: 117 LBS | SYSTOLIC BLOOD PRESSURE: 118 MMHG | HEIGHT: 69 IN | DIASTOLIC BLOOD PRESSURE: 84 MMHG | HEART RATE: 83 BPM | BODY MASS INDEX: 17.33 KG/M2

## 2025-03-31 DIAGNOSIS — R06.09 DOE (DYSPNEA ON EXERTION): ICD-10-CM

## 2025-03-31 DIAGNOSIS — R42 DIZZINESS: ICD-10-CM

## 2025-03-31 DIAGNOSIS — R06.02 SHORTNESS OF BREATH: ICD-10-CM

## 2025-03-31 DIAGNOSIS — R94.31 EKG, ABNORMAL: ICD-10-CM

## 2025-03-31 DIAGNOSIS — Z76.89 ENCOUNTER TO ESTABLISH CARE: Primary | ICD-10-CM

## 2025-03-31 PROCEDURE — 99204 OFFICE O/P NEW MOD 45 MIN: CPT | Performed by: INTERNAL MEDICINE

## 2025-03-31 PROCEDURE — G8419 CALC BMI OUT NRM PARAM NOF/U: HCPCS | Performed by: INTERNAL MEDICINE

## 2025-03-31 PROCEDURE — G8428 CUR MEDS NOT DOCUMENT: HCPCS | Performed by: INTERNAL MEDICINE

## 2025-03-31 PROCEDURE — 93000 ELECTROCARDIOGRAM COMPLETE: CPT | Performed by: INTERNAL MEDICINE

## 2025-03-31 NOTE — PROGRESS NOTES
[address] [place] [address], [place] [address]  PHONE: [phone]    SUBJECTIVE:   Ying Mckeon is a 64 y.o. female 1960   seen for a consultation visit regarding the following:     Chief Complaint   Patient presents with    Consultation     Bifascicular block              Consultation is requested by Viktoriya Ybarra MD  for evaluation of Consultation (Bifascicular block)   .  History of Present Illness  The patient, a 64-year-old female, presents with dyspnea, cervical dystonia, and tremors. She has a history of tremor management under the care of Dr. Praveen Sotomayor. The patient reports increased stress due to caregiving responsibilities and is scheduled for follow-up appointments with Dr. César Ybarra and a psychiatrist. She has previously undergone electroconvulsive therapy (ECT) at the [facility] and is accompanied by her daughter.    Dyspnea was previously attributed to a cardiac issue by Dr. Ybarra, who referred her for further evaluation. The patient denies experiencing dizziness. No carotid imaging or otolaryngology consultation has been performed. She was evaluated by a vertigo specialist, Dr. Taylor, and underwent vestibular therapy. The patient maintains a daily log of symptoms, which include dyspnea, fatigue, blurred vision, ear fullness, and headaches. Her blood pressure readings are within normal limits.    The patient has been under extended care by Dr. Ybarra, with a gap in treatment due to his leave. Cervical dystonia was diagnosed three years ago and is managed with botulinum toxin injections administered by Dr. Praveen Sotomayor. Her last injection was on 03/27/2025, with symptom onset on 03/30/2025, which has since slightly subsided. The efficacy of the injections varies, providing relief for 6 to 12 weeks.     The patient has experienced cephalic tremors for 30 years, which have recently worsened.    She also reports peripheral neuropathy affecting her balance.    SOCIAL

## 2025-04-06 ASSESSMENT — PATIENT HEALTH QUESTIONNAIRE - PHQ9
3. TROUBLE FALLING OR STAYING ASLEEP: SEVERAL DAYS
8. MOVING OR SPEAKING SO SLOWLY THAT OTHER PEOPLE COULD HAVE NOTICED. OR THE OPPOSITE - BEING SO FIDGETY OR RESTLESS THAT YOU HAVE BEEN MOVING AROUND A LOT MORE THAN USUAL: NOT AT ALL
SUM OF ALL RESPONSES TO PHQ QUESTIONS 1-9: 11
9. THOUGHTS THAT YOU WOULD BE BETTER OFF DEAD, OR OF HURTING YOURSELF: NOT AT ALL
5. POOR APPETITE OR OVEREATING: NOT AT ALL
10. IF YOU CHECKED OFF ANY PROBLEMS, HOW DIFFICULT HAVE THESE PROBLEMS MADE IT FOR YOU TO DO YOUR WORK, TAKE CARE OF THINGS AT HOME, OR GET ALONG WITH OTHER PEOPLE: VERY DIFFICULT
7. TROUBLE CONCENTRATING ON THINGS, SUCH AS READING THE NEWSPAPER OR WATCHING TELEVISION: SEVERAL DAYS
6. FEELING BAD ABOUT YOURSELF - OR THAT YOU ARE A FAILURE OR HAVE LET YOURSELF OR YOUR FAMILY DOWN: SEVERAL DAYS
4. FEELING TIRED OR HAVING LITTLE ENERGY: NEARLY EVERY DAY
1. LITTLE INTEREST OR PLEASURE IN DOING THINGS: MORE THAN HALF THE DAYS
SUM OF ALL RESPONSES TO PHQ QUESTIONS 1-9: 9
9. THOUGHTS THAT YOU WOULD BE BETTER OFF DEAD, OR OF HURTING YOURSELF: NOT AT ALL
SUM OF ALL RESPONSES TO PHQ QUESTIONS 1-9: 9
1. LITTLE INTEREST OR PLEASURE IN DOING THINGS: MORE THAN HALF THE DAYS
SUM OF ALL RESPONSES TO PHQ QUESTIONS 1-9: 9
2. FEELING DOWN, DEPRESSED OR HOPELESS: MORE THAN HALF THE DAYS
4. FEELING TIRED OR HAVING LITTLE ENERGY: NEARLY EVERY DAY
6. FEELING BAD ABOUT YOURSELF - OR THAT YOU ARE A FAILURE OR HAVE LET YOURSELF OR YOUR FAMILY DOWN: SEVERAL DAYS
8. MOVING OR SPEAKING SO SLOWLY THAT OTHER PEOPLE COULD HAVE NOTICED. OR THE OPPOSITE, BEING SO FIGETY OR RESTLESS THAT YOU HAVE BEEN MOVING AROUND A LOT MORE THAN USUAL: NOT AT ALL
7. TROUBLE CONCENTRATING ON THINGS, SUCH AS READING THE NEWSPAPER OR WATCHING TELEVISION: SEVERAL DAYS
5. POOR APPETITE OR OVEREATING: SEVERAL DAYS
SUM OF ALL RESPONSES TO PHQ QUESTIONS 1-9: 9
2. FEELING DOWN, DEPRESSED OR HOPELESS: MORE THAN HALF THE DAYS
10. IF YOU CHECKED OFF ANY PROBLEMS, HOW DIFFICULT HAVE THESE PROBLEMS MADE IT FOR YOU TO DO YOUR WORK, TAKE CARE OF THINGS AT HOME, OR GET ALONG WITH OTHER PEOPLE: VERY DIFFICULT
3. TROUBLE FALLING OR STAYING ASLEEP: NOT AT ALL

## 2025-04-06 ASSESSMENT — ANXIETY QUESTIONNAIRES
4. TROUBLE RELAXING: NOT AT ALL
GAD7 TOTAL SCORE: 5
5. BEING SO RESTLESS THAT IT IS HARD TO SIT STILL: NOT AT ALL
3. WORRYING TOO MUCH ABOUT DIFFERENT THINGS: SEVERAL DAYS
7. FEELING AFRAID AS IF SOMETHING AWFUL MIGHT HAPPEN: SEVERAL DAYS
3. WORRYING TOO MUCH ABOUT DIFFERENT THINGS: SEVERAL DAYS
7. FEELING AFRAID AS IF SOMETHING AWFUL MIGHT HAPPEN: SEVERAL DAYS
1. FEELING NERVOUS, ANXIOUS, OR ON EDGE: MORE THAN HALF THE DAYS
6. BECOMING EASILY ANNOYED OR IRRITABLE: NOT AT ALL
IF YOU CHECKED OFF ANY PROBLEMS ON THIS QUESTIONNAIRE, HOW DIFFICULT HAVE THESE PROBLEMS MADE IT FOR YOU TO DO YOUR WORK, TAKE CARE OF THINGS AT HOME, OR GET ALONG WITH OTHER PEOPLE: SOMEWHAT DIFFICULT
4. TROUBLE RELAXING: NOT AT ALL
2. NOT BEING ABLE TO STOP OR CONTROL WORRYING: SEVERAL DAYS
6. BECOMING EASILY ANNOYED OR IRRITABLE: NOT AT ALL
1. FEELING NERVOUS, ANXIOUS, OR ON EDGE: MORE THAN HALF THE DAYS
5. BEING SO RESTLESS THAT IT IS HARD TO SIT STILL: NOT AT ALL
IF YOU CHECKED OFF ANY PROBLEMS ON THIS QUESTIONNAIRE, HOW DIFFICULT HAVE THESE PROBLEMS MADE IT FOR YOU TO DO YOUR WORK, TAKE CARE OF THINGS AT HOME, OR GET ALONG WITH OTHER PEOPLE: SOMEWHAT DIFFICULT
2. NOT BEING ABLE TO STOP OR CONTROL WORRYING: SEVERAL DAYS

## 2025-04-09 ENCOUNTER — TELEMEDICINE (OUTPATIENT)
Dept: BEHAVIORAL/MENTAL HEALTH CLINIC | Age: 65
End: 2025-04-09

## 2025-04-09 DIAGNOSIS — F51.05 INSOMNIA DUE TO MENTAL DISORDER: ICD-10-CM

## 2025-04-09 DIAGNOSIS — G89.4 CHRONIC PAIN DISORDER: ICD-10-CM

## 2025-04-09 DIAGNOSIS — F33.0 MILD EPISODE OF RECURRENT MAJOR DEPRESSIVE DISORDER: Primary | ICD-10-CM

## 2025-04-09 DIAGNOSIS — F41.1 GENERALIZED ANXIETY DISORDER: ICD-10-CM

## 2025-04-09 DIAGNOSIS — Z65.8 PSYCHOSOCIAL STRESSORS: ICD-10-CM

## 2025-04-09 RX ORDER — CLONAZEPAM 0.5 MG/1
0.5 TABLET ORAL 2 TIMES DAILY PRN
Qty: 60 TABLET | Refills: 2 | Status: SHIPPED | OUTPATIENT
Start: 2025-04-09 | End: 2025-08-07

## 2025-04-09 RX ORDER — LAMOTRIGINE 150 MG/1
150 TABLET ORAL DAILY
Qty: 90 TABLET | Refills: 1 | Status: SHIPPED | OUTPATIENT
Start: 2025-04-09

## 2025-04-09 RX ORDER — BUPROPION HYDROCHLORIDE 300 MG/1
300 TABLET ORAL EVERY MORNING
Qty: 90 TABLET | Refills: 1 | Status: SHIPPED | OUTPATIENT
Start: 2025-04-09

## 2025-04-09 RX ORDER — GABAPENTIN 300 MG/1
300 CAPSULE ORAL 3 TIMES DAILY
Qty: 90 CAPSULE | Refills: 5 | Status: SHIPPED | OUTPATIENT
Start: 2025-04-09 | End: 2025-10-06

## 2025-04-09 RX ORDER — TRAZODONE HYDROCHLORIDE 100 MG/1
200 TABLET ORAL NIGHTLY
Qty: 60 TABLET | Refills: 3 | Status: SHIPPED | OUTPATIENT
Start: 2025-04-09

## 2025-04-09 NOTE — PROGRESS NOTES
Patient:  Ying Mckeon  Age:  64 y.o.  :  1960     SEX:  female MRN:  [id number]     RACE: White (non-)     SEEN:  [x]  PATIENT  []  SPOUSE []  OTHER:                  2025    11:36 AM 2025     9:41 AM 10/28/2024    12:17 PM   PHQ-9    Little interest or pleasure in doing things 2 3 1   Feeling down, depressed, or hopeless 2 3 1   Trouble falling or staying asleep, or sleeping too much 0 1 2   Feeling tired or having little energy 3 3 2   Poor appetite or overeating 0 1 2   Feeling bad about yourself - or that you are a failure or have let yourself or your family down 1 1 1   Trouble concentrating on things, such as reading the newspaper or watching television 1 3 2   Moving or speaking so slowly that other people could have noticed. Or the opposite - being so fidgety or restless that you have been moving around a lot more than usual 0 3 1   Thoughts that you would be better off dead, or of hurting yourself in some way 0 0 0   PHQ-2 Score 4  6 2    PHQ-9 Total Score 9 18 12    If you checked off any problems, how difficult have these problems made it for you to do your work, take care of things at home, or get along with other people? 2 2 1       Patient-reported           2025    11:34 AM 2025     9:44 AM 10/28/2024    12:14 PM   GAURAV-7 SCREENING   Feeling nervous, anxious, or on edge More than half the days Nearly every day Several days   Not being able to stop or control worrying Several days Not at all Several days   Worrying too much about different things Several days Several days Several days   Trouble relaxing Not at all Several days Several days   Being so restless that it is hard to sit still Not at all Not at all Not at all   Becoming easily annoyed or irritable Not at all Not at all Not at all   Feeling afraid as if something awful might happen Several days Several days Nearly every day   GAURAV-7 Total Score 5  6 7    How difficult have these problems made it

## 2025-04-14 ENCOUNTER — RESULTS FOLLOW-UP (OUTPATIENT)
Age: 65
End: 2025-04-14

## 2025-04-14 NOTE — RESULT ENCOUNTER NOTE
Your most recent echocardiogram shows no major abnormalities.    Please let me know if you have additional questions or concerns.    Bk Mcnair MD

## 2025-04-25 ENCOUNTER — PATIENT MESSAGE (OUTPATIENT)
Dept: NEUROLOGY | Age: 65
End: 2025-04-25

## 2025-04-25 DIAGNOSIS — R25.1 TREMORS OF NERVOUS SYSTEM: Primary | ICD-10-CM

## 2025-05-02 RX ORDER — PROPRANOLOL HCL 20 MG
20 TABLET ORAL 2 TIMES DAILY
Qty: 60 TABLET | Refills: 2 | Status: SHIPPED | OUTPATIENT
Start: 2025-05-02

## 2025-05-04 ENCOUNTER — CLINICAL DOCUMENTATION (OUTPATIENT)
Dept: NEUROLOGY | Age: 65
End: 2025-05-04

## 2025-05-21 ENCOUNTER — PATIENT MESSAGE (OUTPATIENT)
Dept: NEUROLOGY | Age: 65
End: 2025-05-21

## 2025-06-18 ENCOUNTER — TELEMEDICINE (OUTPATIENT)
Dept: BEHAVIORAL/MENTAL HEALTH CLINIC | Age: 65
End: 2025-06-18

## 2025-06-18 DIAGNOSIS — F51.05 INSOMNIA DUE TO MENTAL DISORDER: ICD-10-CM

## 2025-06-18 DIAGNOSIS — Z63.4 BEREAVEMENT: ICD-10-CM

## 2025-06-18 DIAGNOSIS — F33.0 MILD EPISODE OF RECURRENT MAJOR DEPRESSIVE DISORDER: Primary | ICD-10-CM

## 2025-06-18 DIAGNOSIS — G89.4 CHRONIC PAIN DISORDER: ICD-10-CM

## 2025-06-18 DIAGNOSIS — Z65.8 PSYCHOSOCIAL STRESSORS: ICD-10-CM

## 2025-06-18 DIAGNOSIS — F41.1 GENERALIZED ANXIETY DISORDER: ICD-10-CM

## 2025-06-18 RX ORDER — GABAPENTIN 300 MG/1
300 CAPSULE ORAL 3 TIMES DAILY
Qty: 90 CAPSULE | Refills: 5 | Status: SHIPPED | OUTPATIENT
Start: 2025-06-18 | End: 2025-12-15

## 2025-06-18 RX ORDER — BUPROPION HYDROCHLORIDE 300 MG/1
300 TABLET ORAL EVERY MORNING
Qty: 90 TABLET | Refills: 1 | Status: SHIPPED | OUTPATIENT
Start: 2025-06-18

## 2025-06-18 RX ORDER — TRAZODONE HYDROCHLORIDE 100 MG/1
200 TABLET ORAL NIGHTLY
Qty: 60 TABLET | Refills: 3 | Status: SHIPPED | OUTPATIENT
Start: 2025-06-18

## 2025-06-18 RX ORDER — CLONAZEPAM 0.5 MG/1
0.5 TABLET ORAL 2 TIMES DAILY PRN
Qty: 60 TABLET | Refills: 2 | Status: SHIPPED | OUTPATIENT
Start: 2025-06-18 | End: 2025-10-16

## 2025-06-18 RX ORDER — LAMOTRIGINE 150 MG/1
150 TABLET ORAL DAILY
Qty: 90 TABLET | Refills: 1 | Status: SHIPPED | OUTPATIENT
Start: 2025-06-18

## 2025-06-18 SDOH — SOCIAL STABILITY - SOCIAL INSECURITY: DISSAPEARANCE AND DEATH OF FAMILY MEMBER: Z63.4

## 2025-06-18 ASSESSMENT — PATIENT HEALTH QUESTIONNAIRE - PHQ9
3. TROUBLE FALLING OR STAYING ASLEEP: SEVERAL DAYS
6. FEELING BAD ABOUT YOURSELF - OR THAT YOU ARE A FAILURE OR HAVE LET YOURSELF OR YOUR FAMILY DOWN: SEVERAL DAYS
SUM OF ALL RESPONSES TO PHQ QUESTIONS 1-9: 11
9. THOUGHTS THAT YOU WOULD BE BETTER OFF DEAD, OR OF HURTING YOURSELF: NOT AT ALL
SUM OF ALL RESPONSES TO PHQ QUESTIONS 1-9: 11
2. FEELING DOWN, DEPRESSED OR HOPELESS: SEVERAL DAYS
SUM OF ALL RESPONSES TO PHQ QUESTIONS 1-9: 11
10. IF YOU CHECKED OFF ANY PROBLEMS, HOW DIFFICULT HAVE THESE PROBLEMS MADE IT FOR YOU TO DO YOUR WORK, TAKE CARE OF THINGS AT HOME, OR GET ALONG WITH OTHER PEOPLE: VERY DIFFICULT
4. FEELING TIRED OR HAVING LITTLE ENERGY: SEVERAL DAYS
8. MOVING OR SPEAKING SO SLOWLY THAT OTHER PEOPLE COULD HAVE NOTICED. OR THE OPPOSITE, BEING SO FIGETY OR RESTLESS THAT YOU HAVE BEEN MOVING AROUND A LOT MORE THAN USUAL: NEARLY EVERY DAY
7. TROUBLE CONCENTRATING ON THINGS, SUCH AS READING THE NEWSPAPER OR WATCHING TELEVISION: NEARLY EVERY DAY
SUM OF ALL RESPONSES TO PHQ QUESTIONS 1-9: 11
1. LITTLE INTEREST OR PLEASURE IN DOING THINGS: SEVERAL DAYS
5. POOR APPETITE OR OVEREATING: NOT AT ALL

## 2025-06-18 NOTE — PROGRESS NOTES
Patient:  Ying Mckeon  Age:  64 y.o.  :  1960     SEX:  female MRN:  [id number]     RACE: White (non-)     SEEN:  [x]  PATIENT  []  SPOUSE []  OTHER:                  2025     8:34 AM 2025    11:36 AM 2025     9:41 AM   PHQ-9    Little interest or pleasure in doing things 1 2 3   Feeling down, depressed, or hopeless 1 2 3   Trouble falling or staying asleep, or sleeping too much 1 0 1   Feeling tired or having little energy 1 3 3   Poor appetite or overeating 0 0 1   Feeling bad about yourself - or that you are a failure or have let yourself or your family down 1 1 1   Trouble concentrating on things, such as reading the newspaper or watching television 3 1 3   Moving or speaking so slowly that other people could have noticed. Or the opposite - being so fidgety or restless that you have been moving around a lot more than usual 3 0 3   Thoughts that you would be better off dead, or of hurting yourself in some way 0 0 0   PHQ-2 Score 2 4  6   PHQ-9 Total Score 11 9 18   If you checked off any problems, how difficult have these problems made it for you to do your work, take care of things at home, or get along with other people? 2 2 2       Patient-reported           2025     8:36 AM 2025    11:34 AM 2025     9:44 AM   GAURAV-7 SCREENING   Feeling nervous, anxious, or on edge Nearly every day More than half the days Nearly every day   Not being able to stop or control worrying Several days Several days Not at all   Worrying too much about different things Nearly every day Several days Several days   Trouble relaxing Several days Not at all Several days   Being so restless that it is hard to sit still Several days Not at all Not at all   Becoming easily annoyed or irritable Not at all Not at all Not at all   Feeling afraid as if something awful might happen Nearly every day Several days Several days   GAURAV-7 Total Score 12 5  6   How difficult have these problems

## 2025-08-18 ENCOUNTER — TELEPHONE (OUTPATIENT)
Dept: BEHAVIORAL/MENTAL HEALTH CLINIC | Age: 65
End: 2025-08-18

## 2025-08-19 ENCOUNTER — TELEMEDICINE (OUTPATIENT)
Dept: BEHAVIORAL/MENTAL HEALTH CLINIC | Age: 65
End: 2025-08-19
Payer: MEDICARE

## 2025-08-19 DIAGNOSIS — F41.1 GENERALIZED ANXIETY DISORDER: ICD-10-CM

## 2025-08-19 DIAGNOSIS — F51.05 INSOMNIA DUE TO MENTAL DISORDER: ICD-10-CM

## 2025-08-19 DIAGNOSIS — F33.2 SEVERE EPISODE OF RECURRENT MAJOR DEPRESSIVE DISORDER, WITHOUT PSYCHOTIC FEATURES (HCC): Primary | ICD-10-CM

## 2025-08-19 DIAGNOSIS — Z65.8 PSYCHOSOCIAL STRESSORS: ICD-10-CM

## 2025-08-19 DIAGNOSIS — G25.0 ESSENTIAL TREMOR: ICD-10-CM

## 2025-08-19 DIAGNOSIS — G89.4 CHRONIC PAIN DISORDER: ICD-10-CM

## 2025-08-19 PROCEDURE — 99214 OFFICE O/P EST MOD 30 MIN: CPT | Performed by: PSYCHIATRY & NEUROLOGY

## 2025-08-19 RX ORDER — LAMOTRIGINE 100 MG/1
100 TABLET ORAL 2 TIMES DAILY
Qty: 60 TABLET | Refills: 3 | Status: SHIPPED | OUTPATIENT
Start: 2025-08-19

## 2025-08-19 ASSESSMENT — COLUMBIA-SUICIDE SEVERITY RATING SCALE - C-SSRS
5. HAVE YOU STARTED TO WORK OUT OR WORKED OUT THE DETAILS OF HOW TO KILL YOURSELF? DO YOU INTEND TO CARRY OUT THIS PLAN?: NO
4. HAVE YOU HAD THESE THOUGHTS AND HAD SOME INTENTION OF ACTING ON THEM?: NO
2. HAVE YOU ACTUALLY HAD ANY THOUGHTS OF KILLING YOURSELF?: YES
1. WITHIN THE PAST MONTH, HAVE YOU WISHED YOU WERE DEAD OR WISHED YOU COULD GO TO SLEEP AND NOT WAKE UP?: YES
6. HAVE YOU EVER DONE ANYTHING, STARTED TO DO ANYTHING, OR PREPARED TO DO ANYTHING TO END YOUR LIFE?: YES
3. HAVE YOU BEEN THINKING ABOUT HOW YOU MIGHT KILL YOURSELF?: YES
7. DID THIS OCCUR IN THE LAST THREE MONTHS: NO

## 2025-08-19 ASSESSMENT — PATIENT HEALTH QUESTIONNAIRE - PHQ9
2. FEELING DOWN, DEPRESSED OR HOPELESS: NEARLY EVERY DAY
7. TROUBLE CONCENTRATING ON THINGS, SUCH AS READING THE NEWSPAPER OR WATCHING TELEVISION: MORE THAN HALF THE DAYS
SUM OF ALL RESPONSES TO PHQ QUESTIONS 1-9: 21
SUM OF ALL RESPONSES TO PHQ QUESTIONS 1-9: 21
6. FEELING BAD ABOUT YOURSELF - OR THAT YOU ARE A FAILURE OR HAVE LET YOURSELF OR YOUR FAMILY DOWN: NEARLY EVERY DAY
9. THOUGHTS THAT YOU WOULD BE BETTER OFF DEAD, OR OF HURTING YOURSELF: MORE THAN HALF THE DAYS
4. FEELING TIRED OR HAVING LITTLE ENERGY: NEARLY EVERY DAY
SUM OF ALL RESPONSES TO PHQ QUESTIONS 1-9: 19
SUM OF ALL RESPONSES TO PHQ QUESTIONS 1-9: 21
3. TROUBLE FALLING OR STAYING ASLEEP: SEVERAL DAYS
1. LITTLE INTEREST OR PLEASURE IN DOING THINGS: NEARLY EVERY DAY
10. IF YOU CHECKED OFF ANY PROBLEMS, HOW DIFFICULT HAVE THESE PROBLEMS MADE IT FOR YOU TO DO YOUR WORK, TAKE CARE OF THINGS AT HOME, OR GET ALONG WITH OTHER PEOPLE: VERY DIFFICULT
5. POOR APPETITE OR OVEREATING: MORE THAN HALF THE DAYS
8. MOVING OR SPEAKING SO SLOWLY THAT OTHER PEOPLE COULD HAVE NOTICED. OR THE OPPOSITE, BEING SO FIGETY OR RESTLESS THAT YOU HAVE BEEN MOVING AROUND A LOT MORE THAN USUAL: MORE THAN HALF THE DAYS

## 2025-08-20 ENCOUNTER — TELEPHONE (OUTPATIENT)
Dept: BEHAVIORAL/MENTAL HEALTH CLINIC | Age: 65
End: 2025-08-20